# Patient Record
Sex: FEMALE | Race: WHITE | NOT HISPANIC OR LATINO | Employment: FULL TIME | URBAN - METROPOLITAN AREA
[De-identification: names, ages, dates, MRNs, and addresses within clinical notes are randomized per-mention and may not be internally consistent; named-entity substitution may affect disease eponyms.]

---

## 2017-01-23 ENCOUNTER — OFFICE VISIT (OUTPATIENT)
Dept: FAMILY MEDICINE CLINIC | Facility: CLINIC | Age: 42
End: 2017-01-23

## 2017-01-23 VITALS
BODY MASS INDEX: 19.81 KG/M2 | HEIGHT: 64 IN | HEART RATE: 71 BPM | DIASTOLIC BLOOD PRESSURE: 62 MMHG | TEMPERATURE: 98.2 F | SYSTOLIC BLOOD PRESSURE: 106 MMHG | WEIGHT: 116 LBS | OXYGEN SATURATION: 95 %

## 2017-01-23 DIAGNOSIS — Z00.00 LABORATORY EXAMINATION ORDERED AS PART OF A ROUTINE GENERAL MEDICAL EXAMINATION: ICD-10-CM

## 2017-01-23 DIAGNOSIS — E16.2 HYPOGLYCEMIA: Primary | ICD-10-CM

## 2017-01-23 PROCEDURE — 99213 OFFICE O/P EST LOW 20 MIN: CPT | Performed by: PHYSICIAN ASSISTANT

## 2017-01-23 RX ORDER — FEXOFENADINE HYDROCHLORIDE 60 MG/1
60 TABLET, FILM COATED ORAL DAILY
COMMUNITY

## 2017-01-23 RX ORDER — TRIAMCINOLONE ACETONIDE 55 UG/1
2 SPRAY, METERED NASAL DAILY
COMMUNITY

## 2017-01-23 RX ORDER — NORETHINDRONE ACETATE AND ETHINYL ESTRADIOL, ETHINYL ESTRADIOL AND FERROUS FUMARATE 1MG-10(24)
KIT ORAL
Refills: 12 | COMMUNITY
Start: 2016-12-27

## 2017-01-23 RX ORDER — CLINDAMYCIN PHOSPHATE AND BENZOYL PEROXIDE 10; 37.5 MG/G; MG/G
GEL TOPICAL
Refills: 11 | COMMUNITY
Start: 2016-12-05

## 2017-01-23 RX ORDER — VALACYCLOVIR HYDROCHLORIDE 1 G/1
TABLET, FILM COATED ORAL
Refills: 11 | COMMUNITY
Start: 2016-12-05

## 2017-01-23 NOTE — PATIENT INSTRUCTIONS
Low glycemic index diet  Exercise 30 minutes most days of the week  Make sure you get results on any labs or tests we ordered today  We discussed medications and how to take them as prescribed  Sleep 6-8 hours each night if possible  If you have not signed up for [employer], please activate your code ASAP from your After Visit Summary today    LDL goal <100  LDL goal if heart disease <70  HDL goal >60  Triglyceride goal <150  BP goal =<130/80  Fasting glucose <100

## 2017-01-23 NOTE — MR AVS SNAPSHOT
Farzana Sky   1/23/2017 9:45 AM   Office Visit    Provider:  Ciera Wise PA-C   Department:  [facility]   Dept Phone:  [phone]                Your Full Care Plan              Your Updated Medication List          This list is accurate as of: 1/23/17 10:48 AM.  Always use your most recent med list.                fexofenadine 60 MG tablet   Commonly known as:  ALLEGRA       LO LOESTRIN FE 1 MG-10 MCG / 10 MCG tablet   Generic drug:  Norethin-Eth Estrad-Fe Biphas       MULTI VITAMIN DAILY PO       ONEXTON 1.2-3.75 % gel   Generic drug:  Clindamycin Phos-Benzoyl Perox       Triamcinolone Acetonide 55 MCG/ACT nasal inhaler   Commonly known as:  NASACORT       valACYclovir 1000 MG tablet   Commonly known as:  VALTREX               We Performed the Following     Basic Metabolic Panel     C-Peptide     Hemoglobin A1c     Lipid Panel     T4, Free     TSH     Urinalysis With Microscopic       You Were Diagnosed With        Codes Comments    Hypoglycemia    -  Primary ICD-10-CM: E16.2  ICD-9-CM: 251.2     Laboratory examination ordered as part of a routine general medical examination     ICD-10-CM: Z00.00  ICD-9-CM: V72.62       Instructions    Low glycemic index diet  Exercise 30 minutes most days of the week  Make sure you get results on any labs or tests we ordered today  We discussed medications and how to take them as prescribed  Sleep 6-8 hours each night if possible  If you have not signed up for [employer], please activate your code ASAP from your After Visit Summary today    LDL goal <100  LDL goal if heart disease <70  HDL goal >60  Triglyceride goal <150  BP goal =<130/80  Fasting glucose <100         Patient Instructions History      [employer] Signup     [facility] [employer] allows you to send messages to your doctor, view your test results, renew your prescriptions, schedule appointments, and more. To sign up, go to ClaimReturn and click  "on the Sign Up Now link in the New User? box. Enter your [employer] Activation Code exactly as it appears below along with the last four digits of your Social Security Number and your Date of Birth () to complete the sign-up process. If you do not sign up before the expiration date, you must request a new code.    [employer] Activation Code: [email or web address]  Expires: 2017 10:47 AM    If you have questions, you can email [email or web address] or call [phone] to talk to our [employer] staff. Remember, [employer] is NOT to be used for urgent needs. For medical emergencies, dial 911.               Other Info from Your Visit           Allergies     No Known Allergies      Reason for Visit     Hypertension           Vital Signs     Blood Pressure Pulse Temperature Height Weight Oxygen Saturation    106/62 (BP Location: Right arm, Patient Position: Sitting, Cuff Size: Adult) 71 98.2 °F (36.8 °C) (Oral) 64\" (162.6 cm) 116 lb (52.6 kg) 95%    Body Mass Index Smoking Status                19.91 kg/m2 Never Smoker          Problems and Diagnoses Noted     Low blood sugar    -  Primary    Laboratory examination ordered as part of a routine general medical examination          "

## 2017-02-09 LAB
APPEARANCE UR: CLEAR
BACTERIA #/AREA URNS HPF: ABNORMAL /HPF
BILIRUB UR QL STRIP: NEGATIVE
BUN SERPL-MCNC: 11 MG/DL (ref 6–20)
BUN/CREAT SERPL: 13.1 (ref 7–25)
C PEPTIDE SERPL-MCNC: 1.2 NG/ML (ref 1.1–4.4)
CALCIUM SERPL-MCNC: 8.9 MG/DL (ref 8.6–10.5)
CASTS URNS MICRO: ABNORMAL
CHLORIDE SERPL-SCNC: 104 MMOL/L (ref 98–107)
CHOLEST SERPL-MCNC: 153 MG/DL (ref 0–200)
CO2 SERPL-SCNC: 25.4 MMOL/L (ref 22–29)
COLOR UR: YELLOW
CREAT SERPL-MCNC: 0.84 MG/DL (ref 0.57–1)
EPI CELLS #/AREA URNS HPF: ABNORMAL /HPF
GLUCOSE SERPL-MCNC: 82 MG/DL (ref 65–99)
GLUCOSE UR QL: NEGATIVE
HBA1C MFR BLD: 4.77 % (ref 4.8–5.6)
HDLC SERPL-MCNC: 69 MG/DL (ref 40–60)
HGB UR QL STRIP: NEGATIVE
KETONES UR QL STRIP: NEGATIVE
LDLC SERPL CALC-MCNC: 73 MG/DL (ref 0–100)
LEUKOCYTE ESTERASE UR QL STRIP: (no result)
NITRITE UR QL STRIP: NEGATIVE
PH UR STRIP: 7 [PH] (ref 5–8)
POTASSIUM SERPL-SCNC: 4.3 MMOL/L (ref 3.5–5.2)
PROT UR QL STRIP: NEGATIVE
RBC #/AREA URNS HPF: ABNORMAL /HPF
SODIUM SERPL-SCNC: 142 MMOL/L (ref 136–145)
SP GR UR: 1.02 (ref 1–1.03)
T4 FREE SERPL-MCNC: 1.14 NG/DL (ref 0.93–1.7)
TRIGL SERPL-MCNC: 57 MG/DL (ref 0–150)
TSH SERPL DL<=0.005 MIU/L-ACNC: 2.84 MIU/ML (ref 0.27–4.2)
UROBILINOGEN UR STRIP-MCNC: (no result) MG/DL
VLDLC SERPL CALC-MCNC: 11.4 MG/DL (ref 5–40)
WBC #/AREA URNS HPF: ABNORMAL /HPF

## 2017-09-18 ENCOUNTER — OFFICE VISIT (OUTPATIENT)
Dept: FAMILY MEDICINE CLINIC | Facility: CLINIC | Age: 42
End: 2017-09-18

## 2017-09-18 VITALS
HEART RATE: 54 BPM | TEMPERATURE: 98.9 F | DIASTOLIC BLOOD PRESSURE: 74 MMHG | OXYGEN SATURATION: 99 % | WEIGHT: 117 LBS | HEIGHT: 64 IN | RESPIRATION RATE: 16 BRPM | SYSTOLIC BLOOD PRESSURE: 110 MMHG | BODY MASS INDEX: 19.97 KG/M2

## 2017-09-18 DIAGNOSIS — I88.9 LYMPHADENITIS: ICD-10-CM

## 2017-09-18 DIAGNOSIS — J06.9 ACUTE URI: Primary | ICD-10-CM

## 2017-09-18 PROCEDURE — 99213 OFFICE O/P EST LOW 20 MIN: CPT | Performed by: PHYSICIAN ASSISTANT

## 2017-09-18 RX ORDER — AMOXICILLIN AND CLAVULANATE POTASSIUM 875; 125 MG/1; MG/1
1 TABLET, FILM COATED ORAL 2 TIMES DAILY
Qty: 20 TABLET | Refills: 0 | Status: SHIPPED | OUTPATIENT
Start: 2017-09-18 | End: 2018-04-25

## 2017-09-18 RX ORDER — FLUCONAZOLE 150 MG/1
150 TABLET ORAL ONCE
Qty: 1 TABLET | Refills: 2 | Status: SHIPPED | OUTPATIENT
Start: 2017-09-18 | End: 2017-09-18

## 2017-09-18 NOTE — PROGRESS NOTES
Subjective   Farzana Sky is a 41 y.o. female.     History of Present Illness    Farzana Sky 41 y.o. female who presents today for has palp neck node right side for 6mos and a little smaller.  Has had recent URI and feels another node right side.  she has a history of There is no problem list on file for this patient.  .    She has hx left arm radiculopathy burning at times    No SOA  I will Rx Augmentin and send her to ENT if does not resolve with Tx.  She agrees to make this appt  The following portions of the patient's history were reviewed and updated as appropriate: allergies, current medications, past family history, past medical history, past social history, past surgical history and problem list.    Review of Systems   Constitutional: Positive for fatigue. Negative for activity change, appetite change and unexpected weight change.   HENT: Positive for rhinorrhea, sinus pressure, sore throat, trouble swallowing and voice change. Negative for nosebleeds.    Eyes: Negative for pain and visual disturbance.   Respiratory: Positive for cough. Negative for chest tightness, shortness of breath and wheezing.    Cardiovascular: Negative for chest pain and palpitations.   Gastrointestinal: Negative for abdominal pain and blood in stool.   Endocrine: Positive for cold intolerance.   Genitourinary: Negative for difficulty urinating and hematuria.   Musculoskeletal: Positive for neck pain. Negative for joint swelling.   Skin: Negative for color change and rash.   Allergic/Immunologic: Positive for environmental allergies.   Neurological: Negative for syncope and speech difficulty.   Hematological: Negative for adenopathy.   Psychiatric/Behavioral: Negative for agitation and confusion.   All other systems reviewed and are negative.      Objective   Physical Exam   Constitutional: She is oriented to person, place, and time. She appears well-developed and well-nourished.  Non-toxic appearance. No distress.    HENT:   Head: Normocephalic and atraumatic. Hair is normal.   Right Ear: External ear normal. No drainage, swelling or tenderness. Tympanic membrane is retracted.   Left Ear: External ear normal. No drainage, swelling or tenderness. Tympanic membrane is retracted.   Nose: Mucosal edema present. No epistaxis.   Mouth/Throat: Uvula is midline and mucous membranes are normal. No oral lesions. No uvula swelling. Posterior oropharyngeal erythema present. No oropharyngeal exudate.   Eyes: Conjunctivae and EOM are normal. Pupils are equal, round, and reactive to light. Right eye exhibits no discharge. Left eye exhibits no discharge. No scleral icterus.   Neck: Normal range of motion. Neck supple.   Cardiovascular: Normal rate, regular rhythm and normal heart sounds.  Exam reveals no gallop.    No murmur heard.  Pulmonary/Chest: Breath sounds normal. No stridor. No respiratory distress. She has no wheezes. She has no rales. She exhibits no tenderness.   Abdominal: Soft. There is no tenderness.   Lymphadenopathy:     She has cervical adenopathy (small palp cervical chain and <1cm node right cervical for 6mos; mobile and not attached;  small palp occiptial nodes palp <1cm).   Neurological: She is alert and oriented to person, place, and time. She exhibits normal muscle tone.   Skin: Skin is warm and dry. No rash noted. She is not diaphoretic.   Psychiatric: She has a normal mood and affect. Her behavior is normal. Judgment and thought content normal.   Nursing note and vitals reviewed.      Assessment/Plan   Farzana was seen today for mass.    Diagnoses and all orders for this visit:    Acute URI    Lymphadenitis    Other orders  -     amoxicillin-clavulanate (AUGMENTIN) 875-125 MG per tablet; Take 1 tablet by mouth 2 (Two) Times a Day. One PO BID for infection  -     fluconazole (DIFLUCAN) 150 MG tablet; Take 1 tablet by mouth 1 (One) Time for 1 dose. One PO X 1 for yeast infection

## 2017-09-18 NOTE — PATIENT INSTRUCTIONS
Low glycemic index diet  Exercise 30 minutes most days of the week  Make sure you get results on any labs or tests we ordered today  We discussed medications and how to take them as prescribed  Sleep 6-8 hours each night if possible  If you have not signed up for [employer], please activate your code ASAP from your After Visit Summary today    LDL goal <100  LDL goal if heart disease <70  HDL goal >60  Triglyceride goal <150  BP goal =<130/80  Fasting glucose <100

## 2017-10-03 ENCOUNTER — APPOINTMENT (OUTPATIENT)
Dept: WOMENS IMAGING | Facility: HOSPITAL | Age: 42
End: 2017-10-03

## 2017-10-03 PROCEDURE — 77067 SCR MAMMO BI INCL CAD: CPT | Performed by: RADIOLOGY

## 2018-01-30 ENCOUNTER — OFFICE VISIT (OUTPATIENT)
Dept: FAMILY MEDICINE CLINIC | Facility: CLINIC | Age: 43
End: 2018-01-30

## 2018-01-30 VITALS
BODY MASS INDEX: 19.97 KG/M2 | SYSTOLIC BLOOD PRESSURE: 103 MMHG | HEART RATE: 64 BPM | OXYGEN SATURATION: 97 % | TEMPERATURE: 98.8 F | RESPIRATION RATE: 18 BRPM | HEIGHT: 64 IN | DIASTOLIC BLOOD PRESSURE: 65 MMHG | WEIGHT: 117 LBS

## 2018-01-30 DIAGNOSIS — L24.9 IRRITANT DERMATITIS: Primary | ICD-10-CM

## 2018-01-30 PROCEDURE — 99213 OFFICE O/P EST LOW 20 MIN: CPT | Performed by: NURSE PRACTITIONER

## 2018-01-30 RX ORDER — METHYLPREDNISOLONE 4 MG/1
TABLET ORAL
Qty: 21 TABLET | Refills: 0 | Status: SHIPPED | OUTPATIENT
Start: 2018-01-30 | End: 2018-04-25

## 2018-01-30 NOTE — PROGRESS NOTES
Subjective   Farzana Sky is a 42 y.o. female.     History of Present Illness   Patient presents to office for bilateral eyelid irritation and swelling off and on for a week.  She has noticed redness, burning, itching and puffiness to upper eyelids that is worse in the morning.  She states the only new product was mascara which she has now stopped using.  Symptoms have not improved much since.  She had some right eye irritation this morning when she woke up but states that has cleared up as well. Denies eye redness or discharge.  She denies headache or any other URI symptoms.  She sees allergist regularly for allergy injections.     The following portions of the patient's history were reviewed and updated as appropriate: allergies, current medications, past family history, past medical history, past social history, past surgical history and problem list.    Review of Systems   Constitutional: Negative for chills and fever.   Eyes: Positive for photophobia. Negative for pain, discharge, redness, itching and visual disturbance.   Skin: Positive for rash. Negative for color change.       Objective   Physical Exam   Constitutional: She is oriented to person, place, and time. She appears well-developed and well-nourished.   Eyes: Conjunctivae and EOM are normal. Pupils are equal, round, and reactive to light. Right eye exhibits no discharge and no exudate. Left eye exhibits no discharge and no exudate.   Mild erythema and puffiness to bilateral upper eyelids   Pulmonary/Chest: Effort normal.   Neurological: She is alert and oriented to person, place, and time.   Psychiatric: She has a normal mood and affect. Judgment normal.   Nursing note and vitals reviewed.      Assessment/Plan   Farzana was seen today for eye problem.    Diagnoses and all orders for this visit:    Irritant dermatitis  -     MethylPREDNISolone (MEDROL, NAHID,) 4 MG tablet; Take as directed on package instructions.        Patient will use zyrtec/bendadryl and  cool compresses along with medrol pack.       [facility].  Will f/u if recurrence or symptoms do not improve.

## 2018-02-19 NOTE — PROGRESS NOTES
"Pt to infusion services ambulatory per self.  Pt here for scheduled q 4 week Tysabri infusion.  Plan of care reviewed.  Pt verbalizes understanding.  Pt tells me she has received this course of treatment for \"10 years\" and tolerates well.  Pt denies any s/sx of infection today.  TOUCH pre infusion checklist reviewed and pt appropriate for Tysabri today.  PIV established to RAC, #24G.  IV flushes easily; + blood return verified.  Tysabri administered per MD orders.  Tysabri infusing at this time.  Pt resting in chair.  Call light at hand.  " Subjective   Farzana Sky is a 41 y.o. female.     History of Present Illness   Farzana Sky 41 y.o. female who presents today for glucose  she has a history of There is no problem list on file for this patient.  .        Brother, GM. Aunt DMII    No gestational DMII    Recent GYN glucose was 110  Had 54 at work  I will order HgbA1C and cpeptide    She avoid conc. Sweets  She feels like her glucose is low at times and eats candy when shaky.  Sometimes tired after having sugar.  Had labs at work last month and CMP only shows glucose abnormal at 54  CBC was normal  No recent exercise  The following portions of the patient's history were reviewed and updated as appropriate: allergies, current medications, past family history, past medical history, past social history, past surgical history and problem list.    Review of Systems   Constitutional: Positive for fatigue. Negative for activity change, appetite change and unexpected weight change.   HENT: Negative for nosebleeds and trouble swallowing.    Eyes: Negative for pain and visual disturbance.   Respiratory: Negative for chest tightness, shortness of breath and wheezing.    Cardiovascular: Negative for chest pain and palpitations.   Gastrointestinal: Negative for abdominal pain and blood in stool.   Endocrine: Negative.    Genitourinary: Positive for frequency. Negative for difficulty urinating and hematuria.   Musculoskeletal: Negative for joint swelling.   Skin: Negative for color change and rash.   Allergic/Immunologic: Positive for environmental allergies.   Neurological: Negative for syncope and speech difficulty.   Hematological: Negative for adenopathy.   Psychiatric/Behavioral: Negative for agitation and confusion.   All other systems reviewed and are negative.      Objective   Physical Exam   Constitutional: She is oriented to person, place, and time. She appears well-developed and well-nourished. No distress.   HENT:   Head: Normocephalic and atraumatic.    Eyes: Conjunctivae and EOM are normal. Pupils are equal, round, and reactive to light. Right eye exhibits no discharge. Left eye exhibits no discharge. No scleral icterus.   Neck: Normal range of motion. Neck supple. No tracheal deviation present. No thyromegaly present.   Cardiovascular: Normal rate, regular rhythm, normal heart sounds, intact distal pulses and normal pulses.  Exam reveals no gallop.    No murmur heard.  Pulmonary/Chest: Effort normal and breath sounds normal. No respiratory distress. She has no wheezes. She has no rales.   Musculoskeletal: Normal range of motion.   Neurological: She is alert and oriented to person, place, and time. She exhibits normal muscle tone. Coordination normal.   Skin: Skin is warm. No rash noted. No erythema. No pallor.   Psychiatric: She has a normal mood and affect. Her behavior is normal. Judgment and thought content normal.   Nursing note and vitals reviewed.      Assessment/Plan   Problems Addressed this Visit     None      Visit Diagnoses     Hypoglycemia    -  Primary    Laboratory examination ordered as part of a routine general medical examination

## 2018-04-25 ENCOUNTER — OFFICE VISIT (OUTPATIENT)
Dept: FAMILY MEDICINE CLINIC | Facility: CLINIC | Age: 43
End: 2018-04-25

## 2018-04-25 VITALS
SYSTOLIC BLOOD PRESSURE: 110 MMHG | BODY MASS INDEX: 20.49 KG/M2 | OXYGEN SATURATION: 100 % | DIASTOLIC BLOOD PRESSURE: 72 MMHG | WEIGHT: 120 LBS | HEART RATE: 62 BPM | TEMPERATURE: 98.4 F | RESPIRATION RATE: 16 BRPM | HEIGHT: 64 IN

## 2018-04-25 DIAGNOSIS — E16.2 HYPOGLYCEMIA: ICD-10-CM

## 2018-04-25 DIAGNOSIS — Z00.00 LABORATORY EXAMINATION ORDERED AS PART OF A ROUTINE GENERAL MEDICAL EXAMINATION: ICD-10-CM

## 2018-04-25 DIAGNOSIS — R00.2 PALPITATION: Primary | ICD-10-CM

## 2018-04-25 DIAGNOSIS — R06.00 DYSPNEA, UNSPECIFIED TYPE: ICD-10-CM

## 2018-04-25 PROCEDURE — 71046 X-RAY EXAM CHEST 2 VIEWS: CPT | Performed by: PHYSICIAN ASSISTANT

## 2018-04-25 PROCEDURE — 99214 OFFICE O/P EST MOD 30 MIN: CPT | Performed by: PHYSICIAN ASSISTANT

## 2018-04-25 NOTE — PATIENT INSTRUCTIONS
Low glycemic index diet  Exercise 30 minutes most days of the week  Make sure you get results on any labs or tests we ordered today  We discussed medications and how to take them as prescribed  Sleep 6-8 hours each night if possible  If you have not signed up for [employer], please activate your code ASAP from your After Visit Summary today    LDL goal <100  LDL goal if heart disease <70  HDL goal >60  Triglyceride goal <150  BP goal =<130/80  Fasting glucose <100      Postural Orthostatic Tachycardia Syndrome  Postural orthostatic tachycardia syndrome (POTS) is a group of symptoms that can occur when you stand up after lying down. POTS happens when less blood flows to the heart than normal after you stand up. The reduced blood flow to the heart makes the heart beat rapidly.  POTS may be associated with another medical condition, or it may occur on its own.  What are the causes?  This cause of this condition is not known, but many conditions and diseases have been linked to it.  What increases the risk?  This condition is more likely to develop in:  · Women 15-50 years old.  · Women who are pregnant.  · Women who are menstruating.  · People who have certain conditions, including:  ¨ A viral infection.  ¨ An autoimmune disease.  ¨ Anemia.  ¨ Dehydration.  ¨ Hyperthyroidism.  · People who take certain medicines.  · People who have had a major injury.  · People who have had surgery.  What are the signs or symptoms?  The most common symptom of this condition is lightheadedness after standing up from a lying down position. Other symptoms may include:  · Feeling a rapid increase in the speed of the heartbeat (tachycardia) within 10 minutes of standing up.  · Fainting.  · Weakness.  · Confusion.  · Trembling.  · Shortness of breath.  · Sweating or flushing.  · Headache.  · Chest pain.  · Breathing that is deeper and faster than normal (hyperventilation).  · Nausea.  · Anxiety.  Symptoms may be worse in the morning, and they  may be relieved by lying down.  How is this diagnosed?  This condition is diagnosed based on:  · Your symptoms.  · Your medical history.  · A physical exam.  · Measurements of your heart rate when you are lying down and after you stand up.  · A measurement of your blood pressure. The measurement will be taken when you go from lying down to standing up.  · Blood tests to measure hormones that change with blood pressure. The blood tests will be done when you are lying down and standing up.  You may have other tests to check whether you have a condition or disease that is linked to POTS.  How is this treated?  Treatment for this condition depends on how severe your symptoms are and whether you have any conditions or diseases that have been linked to POTS. Treatment may involve:  · Treating any conditions or diseases that have been linked to POTS.  · Drinking two glasses of water before getting up from a lying position.  · Eating more salt (sodium).  · Taking medicine to control blood pressure and heart rate (beta-blocker).  · Taking medicines to control blood flow, blood pressure, or heart rate.  · Avoiding certain medicines.  · Starting an exercise program under the supervision of your health care provider.  Follow these instructions at home:     Eating and drinking   · Drink enough fluid to keep your urine clear or pale yellow.  · If told by your health care provider, drink two glasses of water before getting up from a lying position.  · Follow instructions from your health care provider about how much sodium you should eat.  · Eat several small meals a day instead of a few large meals.  · Avoid heavy meals.  Medicines   · Take over-the-counter and prescription medicines only as told by your health care provider.  · Talk with your health care provider before starting any new medicines.  Activity   · Do an aerobic exercise for 20 minutes a day, at least 3 days a week.  · Ask your health care provider what kinds of  exercise are safe for you.  Contact a health care provider if:  · Your symptoms do not improve after treatment.  · Your symptoms get worse.  · You develop new symptoms.  Get help right away if:  · You have chest pain.  · You have difficulty breathing.  · You have fainting episodes.  This information is not intended to replace advice given to you by your health care provider. Make sure you discuss any questions you have with your health care provider.  Document Released: 12/08/2003 Document Revised: 01/25/2017 Document Reviewed: 07/01/2016  Elsevier Interactive Patient Education © 2017 [employer]

## 2018-04-25 NOTE — PROGRESS NOTES
Subjective   Farzana Sky is a 42 y.o. female.     History of Present Illness   Farzana Sky 42 y.o. female who presents today continued episodes of starting with weakness in legs for about 20 minutes; then get hot, sweaty, weak, dizzy, and lightheaded---some episodes of SOA with it she has a history of   Patient Active Problem List   Diagnosis   • Palpitation   Sometimes heart will race and episodes also of seeing black and has to sit down or may pass out.  This is getting more often and daily now.  She can have this one hour after eating; I did Cpeptide last Feb and normal  This is always when standing and ? POTS???  Will show labs and get cardio appt;     Patient complains of palpitations and a skipping sensation. The symptoms are mild, occur at bedtime and most nights and last seconds per episode. They tend to occur while at rest. Cardiac risk factors include: none. Aggravating factors are none:  Alleviating factors: not noted and just goes away. Associated symptoms: none. Patient denies: wheezing and chest pain.    X-Ray  Interpretation report in house X-rays that I personally viewed    Relevant Clinical Issues/Diagnoses/Indications:  Episodes dyspnea        Clinical Findings:  No mass and no cardiomeg          Comparative Data:  none          Date of Previous X-ray:  Change on current X-ray    The following portions of the patient's history were reviewed and updated as appropriate: allergies, current medications, past family history, past medical history, past social history, past surgical history and problem list.    Review of Systems   Constitutional: Negative for activity change, appetite change and unexpected weight change.   HENT: Negative for nosebleeds and trouble swallowing.    Eyes: Negative for pain and visual disturbance.   Respiratory: Negative for chest tightness, shortness of breath and wheezing.    Cardiovascular: Positive for palpitations. Negative for chest pain.   Gastrointestinal: Negative for  abdominal pain and blood in stool.   Endocrine: Negative.    Genitourinary: Negative for difficulty urinating and hematuria.   Musculoskeletal: Negative for joint swelling.   Skin: Negative for color change and rash.   Allergic/Immunologic: Positive for environmental allergies.   Neurological: Positive for dizziness, weakness and light-headedness. Negative for syncope and speech difficulty.   Hematological: Negative for adenopathy.   Psychiatric/Behavioral: Negative for agitation and confusion.   All other systems reviewed and are negative.      Objective   Physical Exam   Constitutional: She is oriented to person, place, and time. She appears well-developed and well-nourished. No distress.   HENT:   Head: Normocephalic and atraumatic.   Eyes: Conjunctivae and EOM are normal. Pupils are equal, round, and reactive to light. Right eye exhibits no discharge. Left eye exhibits no discharge. No scleral icterus.   Neck: Normal range of motion. Neck supple. No tracheal deviation present. No thyromegaly present.   Cardiovascular: Normal rate, regular rhythm, normal heart sounds, intact distal pulses and normal pulses.  Exam reveals no gallop.    No murmur heard.  Pulmonary/Chest: Effort normal and breath sounds normal. No respiratory distress. She has no wheezes. She has no rales.   Abdominal: Soft.   Musculoskeletal: Normal range of motion.   Lymphadenopathy:     She has no cervical adenopathy.   Neurological: She is alert and oriented to person, place, and time. She exhibits normal muscle tone. Coordination normal.   Skin: Skin is warm. No rash noted. No erythema. No pallor.   Psychiatric: She has a normal mood and affect. Her behavior is normal. Judgment and thought content normal.   Nursing note and vitals reviewed.      Assessment/Plan   Farzana was seen today for dizziness.    Diagnoses and all orders for this visit:    Palpitation  Comments:  ?POTS?  Orders:  -     Comprehensive metabolic panel  -     Lipid panel  -      CBC and Differential  -     TSH  -     T3, Free  -     T4, Free  -     Hemoglobin A1c  -     C-Peptide  -     XR Chest PA & Lateral  -     Vitamin D 25 Hydroxy  -     Vitamin B12  -     Folate  -     Ambulatory Referral to Cardiology    Dyspnea, unspecified type  -     Comprehensive metabolic panel  -     Lipid panel  -     CBC and Differential  -     TSH  -     T3, Free  -     T4, Free  -     Hemoglobin A1c  -     C-Peptide  -     XR Chest PA & Lateral  -     Vitamin D 25 Hydroxy  -     Vitamin B12  -     Folate  -     Ambulatory Referral to Cardiology    Laboratory examination ordered as part of a routine general medical examination  -     Comprehensive metabolic panel  -     Lipid panel  -     CBC and Differential  -     TSH  -     T3, Free  -     T4, Free  -     Hemoglobin A1c  -     C-Peptide  -     XR Chest PA & Lateral  -     Vitamin D 25 Hydroxy  -     Vitamin B12  -     Folate  -     Ambulatory Referral to Cardiology    Hypoglycemia

## 2018-05-10 LAB
25(OH)D3+25(OH)D2 SERPL-MCNC: 49.3 NG/ML (ref 30–100)
ALBUMIN SERPL-MCNC: 4.4 G/DL (ref 3.5–5.2)
ALBUMIN/GLOB SERPL: 1.6 G/DL
ALP SERPL-CCNC: 52 U/L (ref 39–117)
ALT SERPL-CCNC: 10 U/L (ref 1–33)
AST SERPL-CCNC: 16 U/L (ref 1–32)
BASOPHILS # BLD AUTO: 0.04 10*3/MM3 (ref 0–0.2)
BASOPHILS NFR BLD AUTO: 1 % (ref 0–1.5)
BILIRUB SERPL-MCNC: 0.4 MG/DL (ref 0.1–1.2)
BUN SERPL-MCNC: 12 MG/DL (ref 6–20)
BUN/CREAT SERPL: 15 (ref 7–25)
C PEPTIDE SERPL-MCNC: 1.2 NG/ML (ref 1.1–4.4)
CALCIUM SERPL-MCNC: 9.1 MG/DL (ref 8.6–10.5)
CHLORIDE SERPL-SCNC: 103 MMOL/L (ref 98–107)
CHOLEST SERPL-MCNC: 162 MG/DL (ref 0–200)
CO2 SERPL-SCNC: 26 MMOL/L (ref 22–29)
CREAT SERPL-MCNC: 0.8 MG/DL (ref 0.57–1)
EOSINOPHIL # BLD AUTO: 0.08 10*3/MM3 (ref 0–0.7)
EOSINOPHIL NFR BLD AUTO: 1.9 % (ref 0.3–6.2)
ERYTHROCYTE [DISTWIDTH] IN BLOOD BY AUTOMATED COUNT: 12.9 % (ref 11.7–13)
FOLATE SERPL-MCNC: 16.96 NG/ML (ref 4.78–24.2)
GFR SERPLBLD CREATININE-BSD FMLA CKD-EPI: 79 ML/MIN/1.73
GFR SERPLBLD CREATININE-BSD FMLA CKD-EPI: 96 ML/MIN/1.73
GLOBULIN SER CALC-MCNC: 2.8 GM/DL
GLUCOSE SERPL-MCNC: 80 MG/DL (ref 65–99)
HBA1C MFR BLD: 4.7 % (ref 4.8–5.6)
HCT VFR BLD AUTO: 42.5 % (ref 35.6–45.5)
HDLC SERPL-MCNC: 59 MG/DL (ref 40–60)
HGB BLD-MCNC: 13.8 G/DL (ref 11.9–15.5)
IMM GRANULOCYTES # BLD: 0 10*3/MM3 (ref 0–0.03)
IMM GRANULOCYTES NFR BLD: 0 % (ref 0–0.5)
LDLC SERPL CALC-MCNC: 87 MG/DL (ref 0–100)
LYMPHOCYTES # BLD AUTO: 1.01 10*3/MM3 (ref 0.9–4.8)
LYMPHOCYTES NFR BLD AUTO: 24 % (ref 19.6–45.3)
MCH RBC QN AUTO: 32.3 PG (ref 26.9–32)
MCHC RBC AUTO-ENTMCNC: 32.5 G/DL (ref 32.4–36.3)
MCV RBC AUTO: 99.5 FL (ref 80.5–98.2)
MONOCYTES # BLD AUTO: 0.22 10*3/MM3 (ref 0.2–1.2)
MONOCYTES NFR BLD AUTO: 5.2 % (ref 5–12)
NEUTROPHILS # BLD AUTO: 2.86 10*3/MM3 (ref 1.9–8.1)
NEUTROPHILS NFR BLD AUTO: 67.9 % (ref 42.7–76)
PLATELET # BLD AUTO: 194 10*3/MM3 (ref 140–500)
POTASSIUM SERPL-SCNC: 4.6 MMOL/L (ref 3.5–5.2)
PROT SERPL-MCNC: 7.2 G/DL (ref 6–8.5)
RBC # BLD AUTO: 4.27 10*6/MM3 (ref 3.9–5.2)
SODIUM SERPL-SCNC: 141 MMOL/L (ref 136–145)
T3FREE SERPL-MCNC: 2.7 PG/ML (ref 2–4.4)
T4 FREE SERPL-MCNC: 1.23 NG/DL (ref 0.93–1.7)
TRIGL SERPL-MCNC: 81 MG/DL (ref 0–150)
TSH SERPL DL<=0.005 MIU/L-ACNC: 2.86 MIU/ML (ref 0.27–4.2)
VIT B12 SERPL-MCNC: 766 PG/ML (ref 211–946)
VLDLC SERPL CALC-MCNC: 16.2 MG/DL (ref 5–40)
WBC # BLD AUTO: 4.21 10*3/MM3 (ref 4.5–10.7)

## 2018-10-09 ENCOUNTER — APPOINTMENT (OUTPATIENT)
Dept: WOMENS IMAGING | Facility: HOSPITAL | Age: 43
End: 2018-10-09

## 2018-10-09 PROCEDURE — 77063 BREAST TOMOSYNTHESIS BI: CPT | Performed by: RADIOLOGY

## 2018-10-09 PROCEDURE — 77067 SCR MAMMO BI INCL CAD: CPT | Performed by: RADIOLOGY

## 2018-10-29 ENCOUNTER — OFFICE VISIT (OUTPATIENT)
Dept: FAMILY MEDICINE CLINIC | Facility: CLINIC | Age: 43
End: 2018-10-29

## 2018-10-29 VITALS
HEART RATE: 76 BPM | BODY MASS INDEX: 21 KG/M2 | HEIGHT: 64 IN | RESPIRATION RATE: 16 BRPM | SYSTOLIC BLOOD PRESSURE: 122 MMHG | OXYGEN SATURATION: 100 % | WEIGHT: 123 LBS | TEMPERATURE: 98.6 F | DIASTOLIC BLOOD PRESSURE: 68 MMHG

## 2018-10-29 DIAGNOSIS — M76.61 ACHILLES TENDINITIS OF BOTH LOWER EXTREMITIES: ICD-10-CM

## 2018-10-29 DIAGNOSIS — S39.012A STRAIN OF LUMBAR REGION, INITIAL ENCOUNTER: ICD-10-CM

## 2018-10-29 DIAGNOSIS — M76.62 ACHILLES TENDINITIS OF BOTH LOWER EXTREMITIES: ICD-10-CM

## 2018-10-29 DIAGNOSIS — M72.2 PLANTAR FASCIITIS: Primary | ICD-10-CM

## 2018-10-29 PROCEDURE — 99213 OFFICE O/P EST LOW 20 MIN: CPT | Performed by: PHYSICIAN ASSISTANT

## 2018-10-29 RX ORDER — IBUPROFEN 800 MG/1
800 TABLET ORAL EVERY 8 HOURS PRN
Qty: 60 TABLET | Refills: 1 | Status: SHIPPED | OUTPATIENT
Start: 2018-10-29

## 2018-10-29 RX ORDER — BACLOFEN 10 MG/1
10 TABLET ORAL 3 TIMES DAILY
Qty: 45 TABLET | Refills: 1 | Status: SHIPPED | OUTPATIENT
Start: 2018-10-29 | End: 2018-12-03

## 2018-10-29 NOTE — PROGRESS NOTES
Subjective   Farzana Sky is a 42 y.o. female.     History of Present Illness   Patient complains of bilateral plantar heel pain and now achilles pain. The symptoms began several weeks ago.  Pain is a result of likely started from having plantar fasciitis and got new shoes and orthotics. Also doing exercises for it.  Fell down stairs Sat and sore lumbar mid distal; sore to touch and ROM;  Pain is located L>R region. Discomfort is described as aching and throbbing. Sometimes burns; Symptoms are exacerbated by pressure applied to area and movement.  Evaluation to date: none. Therapy to date includes: ice and OTC NSAIDs    I will have her see ortho next;      The following portions of the patient's history were reviewed and updated as appropriate: allergies, current medications, past family history, past medical history, past social history, past surgical history and problem list.    Review of Systems   Constitutional: Negative for activity change, appetite change and unexpected weight change.   HENT: Negative for nosebleeds and trouble swallowing.    Eyes: Negative for pain and visual disturbance.   Respiratory: Negative for chest tightness, shortness of breath and wheezing.    Cardiovascular: Positive for leg swelling. Negative for chest pain and palpitations.   Gastrointestinal: Negative for abdominal pain and blood in stool.   Endocrine: Negative.    Genitourinary: Negative for difficulty urinating and hematuria.   Musculoskeletal: Positive for arthralgias, back pain and joint swelling.   Skin: Negative for color change and rash.   Allergic/Immunologic: Positive for environmental allergies.   Neurological: Negative for syncope and speech difficulty.   Hematological: Negative for adenopathy.   Psychiatric/Behavioral: Negative for agitation and confusion.   All other systems reviewed and are negative.      Objective   Physical Exam   Constitutional: She is oriented to person, place, and time. She appears  well-developed and well-nourished. No distress.   HENT:   Head: Normocephalic and atraumatic.   Eyes: Pupils are equal, round, and reactive to light. Conjunctivae and EOM are normal. Right eye exhibits no discharge. Left eye exhibits no discharge. No scleral icterus.   Neck: Normal range of motion. Neck supple. No tracheal deviation present. No thyromegaly present.   Cardiovascular: Normal pulses.    Pulmonary/Chest: Effort normal and breath sounds normal. No respiratory distress. She has no wheezes. She has no rales.   Musculoskeletal: Normal range of motion. She exhibits tenderness. She exhibits no edema or deformity.   Sore heels bilat to touch and bear weight; achilles tender bilat L >R; squeezing calf does plantar flex; sore; not hot or red  Sore lumbar sacral midline bilat and across ;  ROM pain; Motor and sensation intact  SLR to 90* bilateral  Dorsiflexion of great toes intact     Neurological: She is alert and oriented to person, place, and time. She exhibits normal muscle tone. Coordination normal.   Skin: Skin is warm. No rash noted. No erythema. No pallor.   Psychiatric: She has a normal mood and affect. Her behavior is normal. Judgment and thought content normal.   Nursing note and vitals reviewed.      Assessment/Plan   Farzana was seen today for foot pain and fall.    Diagnoses and all orders for this visit:    Plantar fasciitis  -     Ambulatory Referral to Orthopedic Surgery    Achilles tendinitis of both lower extremities  -     Ambulatory Referral to Orthopedic Surgery    Strain of lumbar region, initial encounter    Other orders  -     ibuprofen (ADVIL,MOTRIN) 800 MG tablet; Take 1 tablet by mouth Every 8 (Eight) Hours As Needed for Mild Pain . Stop if GI upset  -     baclofen (LIORESAL) 10 MG tablet; Take 1 tablet by mouth 3 (Three) Times a Day. PRN spasms

## 2018-10-29 NOTE — PATIENT INSTRUCTIONS
Rest and ice  Achilles Tendinitis  Achilles tendinitis is inflammation of the tough, cord-like band that attaches the lower leg muscles to the heel bone (Achilles tendon). This is usually caused by overusing the tendon and the ankle joint.  Achilles tendinitis usually gets better over time with treatment and caring for yourself at home. It can take weeks or months to heal completely.  What are the causes?  This condition may be caused by:  · A sudden increase in exercise or activity, such as running.  · Doing the same exercises or activities (such as jumping) over and over.  · Not warming up calf muscles before exercising.  · Exercising in shoes that are worn out or not made for exercise.  · Having arthritis or a bone growth (spur) on the back of the heel bone. This can rub against the tendon and hurt it.  · Age-related wear and tear. Tendons become less flexible with age and more likely to be injured.    What are the signs or symptoms?  Common symptoms of this condition include:  · Pain in the Achilles tendon or in the back of the leg, just above the heel. The pain usually gets worse with exercise.  · Stiffness or soreness in the back of the leg, especially in the morning.  · Swelling of the skin over the Achilles tendon.  · Thickening of the tendon.  · Bone spurs at the bottom of the Achilles tendon, near the heel.  · Trouble standing on tiptoe.    How is this diagnosed?  This condition is diagnosed based on your symptoms and a physical exam. You may have tests, including:  · X-rays.  · MRI.    How is this treated?  The goal of treatment is to relieve symptoms and help your injury heal. Treatment may include:  · Decreasing or stopping activities that caused the tendinitis. This may mean switching to low-impact exercises like biking or swimming.  · Icing the injured area.  · Doing physical therapy, including strengthening and stretching exercises.  · NSAIDs to help relieve pain and swelling.  · Using supportive  shoes, wraps, heel lifts, or a walking boot (air cast).  · Surgery. This may be done if your symptoms do not improve after 6 months.  · Using high-energy shock wave impulses to stimulate the healing process (extracorporeal shock wave therapy). This is rare.  · Injection of medicines to help relieve inflammation (corticosteroids). This is rare.    Follow these instructions at home:  If you have an air cast:  · Wear the cast as told by your health care provider. Remove it only as told by your health care provider.  · Loosen the cast if your toes tingle, become numb, or turn cold and blue.  Activity  · Gradually return to your normal activities once your health care provider approves. Do not do activities that cause pain.  ? Consider doing low-impact exercises, like cycling or swimming.  · If you have an air cast, ask your health care provider when it is safe for you to drive.  · If physical therapy was prescribed, do exercises as told by your health care provider or physical therapist.  Managing pain, stiffness, and swelling  · Raise (elevate) your foot above the level of your heart while you are sitting or lying down.  · Move your toes often to avoid stiffness and to lessen swelling.  · If directed, put ice on the injured area:  ? Put ice in a plastic bag.  ? Place a towel between your skin and the bag.  ? Leave the ice on for 20 minutes, 2-3 times a day  General instructions  · If directed, wrap your foot with an elastic bandage or other wrap. This can help keep your tendon from moving too much while it heals. Your health care provider will show you how to wrap your foot correctly.  · Wear supportive shoes or heel lifts only as told by your health care provider.  · Take over-the-counter and prescription medicines only as told by your health care provider.  · Keep all follow-up visits as told by your health care provider. This is important.  Contact a health care provider if:  · You have symptoms that gets  worse.  · You have pain that does not get better with medicine.  · You develop new, unexplained symptoms.  · You develop warmth and swelling in your foot.  · You have a fever.  Get help right away if:  · You have a sudden popping sound or sensation in your Achilles tendon followed by severe pain.  · You cannot move your toes or foot.  · You cannot put any weight on your foot.  Summary  · Achilles tendinitis is inflammation of the tough, cord-like band that attaches the lower leg muscles to the heel bone (Achilles tendon).  · This condition is usually caused by overusing the tendon and the ankle joint. It can also be caused by arthritis or normal aging.  · The most common symptoms of this condition include pain, swelling, or stiffness in the Achilles tendon or in the back of the leg.  · This condition is usually treated with rest, NSAIDs, and physical therapy.  This information is not intended to replace advice given to you by your health care provider. Make sure you discuss any questions you have with your health care provider.  Document Released: 09/27/2006 Document Revised: 11/06/2017 Document Reviewed: 11/06/2017  [employer] Interactive Patient Education © 2017 [employer]

## 2018-12-03 ENCOUNTER — OFFICE VISIT (OUTPATIENT)
Dept: ORTHOPEDIC SURGERY | Facility: CLINIC | Age: 43
End: 2018-12-03

## 2018-12-03 VITALS — WEIGHT: 123 LBS | BODY MASS INDEX: 21 KG/M2 | HEIGHT: 64 IN | TEMPERATURE: 98.3 F

## 2018-12-03 DIAGNOSIS — M72.2 PLANTAR FASCIITIS: Primary | ICD-10-CM

## 2018-12-03 DIAGNOSIS — M76.62 ACHILLES TENDONITIS, BILATERAL: ICD-10-CM

## 2018-12-03 DIAGNOSIS — M79.672 FOOT PAIN, BILATERAL: ICD-10-CM

## 2018-12-03 DIAGNOSIS — M77.50 RETROCALCANEAL BURSITIS, UNSPECIFIED LATERALITY: ICD-10-CM

## 2018-12-03 DIAGNOSIS — M76.61 ACHILLES TENDONITIS, BILATERAL: ICD-10-CM

## 2018-12-03 DIAGNOSIS — M79.671 FOOT PAIN, BILATERAL: ICD-10-CM

## 2018-12-03 PROCEDURE — 99244 OFF/OP CNSLTJ NEW/EST MOD 40: CPT | Performed by: ORTHOPAEDIC SURGERY

## 2018-12-03 PROCEDURE — 73630 X-RAY EXAM OF FOOT: CPT | Performed by: ORTHOPAEDIC SURGERY

## 2018-12-03 NOTE — PROGRESS NOTES
New Patient Complaint      Patient: Farzana Sky  YOB: 1975 42 y.o. female  Medical Record Number: [id number]    Chief Complaints: My heels and my ankles hurt     History of Present Illness:  Patient reports a 7 month history of moderate intermittent stabbing aching burning pain in both heels and subcutaneous into the posterior aspect of both ankles with some extension more proximally on the left.  Symptoms are worse with standing and walking and improved anti-inflammatories ice and rest and are associated with swelling.    She's been treating herself with new shoe inserts and shoes as well as some gentle stretching exercises and the right has significantly improved.  Left has improved but has not resolved.  Main areas that hurt the plantar aspect of the heel in the retrocalcaneal bursal area along the Achilles as well.  She lives [place] acres [place] land and takes care of a lot of animals.  She works at [employer]    She is seen today at the request of Ciera Wise PA-C as requested my opinion regarding etiology and treatment of this condition.        HPI    Allergies: No Known Allergies    Medications:   Current Outpatient Medications on File Prior to Visit   Medication Sig   • fexofenadine (ALLEGRA) 60 MG tablet Take 60 mg by mouth Daily.   • LO LOESTRIN FE 1 MG-10 MCG / 10 MCG tablet TK 1 T PO D   • Multiple Vitamin (MULTI VITAMIN DAILY PO) Take  by mouth.   • ONEXTON 1.2-3.75 % gel APPLY TO FACE NIGHTLY UTD   • Triamcinolone Acetonide (NASACORT) 55 MCG/ACT nasal inhaler 2 sprays into each nostril Daily.   • valACYclovir (VALTREX) 1000 MG tablet TK 1 T PO D   • [DISCONTINUED] baclofen (LIORESAL) 10 MG tablet Take 1 tablet by mouth 3 (Three) Times a Day. PRN spasms   • ibuprofen (ADVIL,MOTRIN) 800 MG tablet Take 1 tablet by mouth Every 8 (Eight) Hours As Needed for Mild Pain . Stop if GI upset     No current facility-administered medications on file prior to visit.        Past Medical History:  "  Diagnosis Date   • Abnormal MRI, cervical spine     DDD changes C4-5, 5-6 with mod R lateral disc protrusion of broad based herniation abuts ventral surface of cord anterlolaterally to R with mild to mod flattening of cord at c5-6, mild cerebellar tonsillar ectopia   • Allergic dermatitis of eyelid 03/02/2011   • Cervical radiculopathy 10/19/2015   • Clavicle fracture     age 18, right, closed   • Endometrial polyp 03/2011   • H/O colonoscopy 07/01/2011    [facility]: Dr. Gonzalez: internal hemorrhiods and non-thrombosed external hemorrhoids   • Headache    • History of esophagogastroduodenoscopy (EGD) 07/01/2011    [facility]: Dr. Gonzalez: LA grade B esophagitis, erythema of gastric mucosa    • IBS (irritable bowel syndrome) 05/25/2011   • Sialoadenitis 06/10/2009     Past Surgical History:   Procedure Laterality Date   • CHOLECYSTECTOMY       Social History     Occupational History   • Not on file   Tobacco Use   • Smoking status: Never Smoker   • Smokeless tobacco: Never Used   Substance and Sexual Activity   • Alcohol use: Yes     Comment: Social   • Drug use: No   • Sexual activity: Defer      Social History     Social History Narrative   • Not on file     Family History   Problem Relation Age of Onset   • Other Mother         trigeminal neuralgia   • Thyroid cancer Mother    • Alcohol abuse Father    • Asthma Father    • Diabetes Brother    • Diabetes Maternal Grandmother        Review of Systems: 14 point review of systems performed, positive pertinent findings identified in HPI. All remaining systems negative     Review of Systems      Physical Exam:   Vitals:    12/03/18 1459   Temp: 98.3 °F (36.8 °C)   Weight: 55.8 kg (123 lb)   Height: 162.6 cm (64\")     Physical Exam   Constitutional: pleasant, well developed   Eyes: sclera non icteric  Hearing : adequate for exam  Cardiovascular: palpable pulses in bilaeral feet, bilateral calves/ thighs NT without sign of DVT  Respiratoy: breathing unlabored   Neurological: grossly " sensate to LT throughout bilateral LEs  Psychiatric: oriented with normal mood and affect.   Lymphatic: No palpable popliteal lymphadenopathy bilateral LEs  Skin: intact throughout bilateral legs/feet  Musculoskeletal: Nonantalgic gait.  No focal warmth erythema or swelling to either lower extremity.  There is minimal if any discomfort palpation at the insertion of the plantar fascia and along the Achilles and retrocalcaneal bursa on the right.  On the left there is moderate discomfort at the insertion of the plantar fascia was discomfort in the retrocalcaneal bursa but no focal swelling along the Achilles.  5 out of 5 plantarflexion strength.  Mild discomfort along the left Achilles  Physical Exam  Ortho Exam    Radiology: 3 views of both ankles ordered to evaluate pain reviewed and no prior records available for comparison there is slight plantar spurring as well as some prominence of the superior calcaneal tuberosity left greater than right.  On the left there is an os trigonum.    Assessment/Plan: 1.  Bilateral plantar fasciitis left greater than right  2.  Retrocalcaneal bursitis left greater than right with os trigonum on the left  3.  Achilles tendinitis    We discussed treatment options and the right is markedly better and left is improving I would not recommend any surgical treatment nor any further imaging at this time.    I demonstrated heel cord stretching exercises for her do at least 4 times a day.  Instruction she was provided and demonstrated for her and we will send her for a night splint on the left and encouraged use of ice and may continue use of ibuprofen as needed.    We'll also have her see Dr. Ellis at [facility] to see what other nonoperative modalities he has to offer and I will see her back in 6 weeks.  If symptoms persist or worsen we'll get an MRI of her left hindfoot.

## 2018-12-17 ENCOUNTER — OFFICE VISIT (OUTPATIENT)
Dept: SPORTS MEDICINE | Facility: CLINIC | Age: 43
End: 2018-12-17

## 2018-12-17 VITALS
BODY MASS INDEX: 19.97 KG/M2 | DIASTOLIC BLOOD PRESSURE: 68 MMHG | SYSTOLIC BLOOD PRESSURE: 116 MMHG | HEART RATE: 62 BPM | OXYGEN SATURATION: 99 % | WEIGHT: 117 LBS | HEIGHT: 64 IN

## 2018-12-17 DIAGNOSIS — M76.62 ACHILLES TENDINITIS OF BOTH LOWER EXTREMITIES: ICD-10-CM

## 2018-12-17 DIAGNOSIS — M76.61 ACHILLES TENDINITIS OF BOTH LOWER EXTREMITIES: ICD-10-CM

## 2018-12-17 DIAGNOSIS — M79.672 CHRONIC HEEL PAIN, LEFT: Primary | ICD-10-CM

## 2018-12-17 DIAGNOSIS — M79.671 CHRONIC HEEL PAIN, RIGHT: ICD-10-CM

## 2018-12-17 DIAGNOSIS — G89.29 CHRONIC HEEL PAIN, LEFT: Primary | ICD-10-CM

## 2018-12-17 DIAGNOSIS — G89.29 CHRONIC HEEL PAIN, RIGHT: ICD-10-CM

## 2018-12-17 PROCEDURE — 99244 OFF/OP CNSLTJ NEW/EST MOD 40: CPT | Performed by: FAMILY MEDICINE

## 2018-12-17 RX ORDER — NITROGLYCERIN 0.1MG/HR
PATCH, TRANSDERMAL 24 HOURS TRANSDERMAL
Qty: 10 PATCH | Refills: 0 | Status: SHIPPED | OUTPATIENT
Start: 2018-12-17 | End: 2020-02-03

## 2018-12-17 NOTE — PROGRESS NOTES
"Farznaa is a 43 y.o. year old female    Chief Complaint   Patient presents with   • Left Ankle - Pain   • Right Ankle - Pain       History of Present Illness  Referred here by Dr. Polo for ongoing bilateral heel pain.  Samreen reports that her symptoms began around [place] this year.  She has had pain in the heels, left worse than right that has radiated up into her calf as well as down into her foot.  Has been self treating for plantar fasciitis.  Her right heel pain has been slowly improving.  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  She enjoys walking for fitness both outside and on the treadmill.  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  Has purchased power step inserts, appropriate fitting new balance shoes from [employer].    I have reviewed the patient's medical, family, and social history in detail and updated the computerized patient record.    Review of Systems  Constitutional: Negative for fever.   Musculoskeletal:        Per HPI   Skin: Negative for rash.   Neurological: Negative for weakness and numbness.   Psychiatric/Behavioral: Negative for sleep disturbance.   All other systems reviewed and are negative.    /68 (BP Location: Left arm, Patient Position: Sitting, Cuff Size: Adult)   Pulse 62   Ht 162.6 cm (64.02\")   Wt 53.1 kg (117 lb)   SpO2 99%   BMI 20.07 kg/m²      Physical Exam    Vital signs reviewed.   General: No acute distress.  Eyes: conjunctiva clear; pupils equally round and reactive  ENT: external ears and nose atraumatic; oropharynx clear  CV: no peripheral edema, 2+ distal pulses  Resp: normal respiratory effort, no use of accessory muscles  Skin: no rashes or wounds; normal turgor  Psych: mood and affect appropriate; recent and remote memory intact  Neuro: sensation to light touch intact    MSK Exam:    Bilateral ankle, foot demonstrates full range of motion.  Negative anterior drawer.  " Tenderness along the distal left Achilles, retrocalcaneal bursa.  Tenderness along the origin of the left plantar fascia.  5/5 strength.    Left Ankle X-Ray  Indication: Pain  Views: AP, Lateral, Mortise    Findings:  No fracture  Os trigonum   Soft tissues normal  Normal joint spaces    No prior studies available for comparison.    Diagnoses and all orders for this visit:    Chronic heel pain, left  -     XR Ankle 3+ View Left    Chronic heel pain, right    Achilles tendinitis of both lower extremities  -     nitroglycerin (NITRODUR) 0.1 MG/HR patch; Use as directed      Discuss treatment options for her heel pain, Achilles tendinitis.  Discussed utility of Nitropatch in this setting to help increase and promote blood flow to the area.  Encouraged her to purchase [place] sock for the left heel to wear at night.  Home exercise program given.  Did discuss potential advanced imaging after 6 weeks if persistent.    EMR Dragon/Transcription disclaimer:    Much of this encounter note is an electronic transcription/translation of spoken language to printed text.  The electronic translation of spoken language may permit erroneous, or at times, nonsensical words or phrases to be inadvertently transcribed.  Although I have reviewed the note for such errors some may still exist.

## 2019-10-11 ENCOUNTER — APPOINTMENT (OUTPATIENT)
Dept: WOMENS IMAGING | Facility: HOSPITAL | Age: 44
End: 2019-10-11

## 2019-10-11 PROCEDURE — 77067 SCR MAMMO BI INCL CAD: CPT | Performed by: RADIOLOGY

## 2019-10-11 PROCEDURE — 77063 BREAST TOMOSYNTHESIS BI: CPT | Performed by: RADIOLOGY

## 2020-02-03 ENCOUNTER — OFFICE VISIT (OUTPATIENT)
Dept: FAMILY MEDICINE CLINIC | Facility: CLINIC | Age: 45
End: 2020-02-03

## 2020-02-03 VITALS
HEIGHT: 64 IN | SYSTOLIC BLOOD PRESSURE: 110 MMHG | DIASTOLIC BLOOD PRESSURE: 62 MMHG | OXYGEN SATURATION: 100 % | HEART RATE: 65 BPM | BODY MASS INDEX: 21.51 KG/M2 | TEMPERATURE: 98.6 F | RESPIRATION RATE: 16 BRPM | WEIGHT: 126 LBS

## 2020-02-03 DIAGNOSIS — J01.90 ACUTE SINUSITIS, RECURRENCE NOT SPECIFIED, UNSPECIFIED LOCATION: Primary | ICD-10-CM

## 2020-02-03 DIAGNOSIS — H57.11 ORBITAL PAIN, RIGHT: ICD-10-CM

## 2020-02-03 PROCEDURE — 99213 OFFICE O/P EST LOW 20 MIN: CPT | Performed by: PHYSICIAN ASSISTANT

## 2020-02-03 RX ORDER — PREDNISONE 20 MG/1
20 TABLET ORAL 2 TIMES DAILY
Qty: 10 TABLET | Refills: 0 | Status: SHIPPED | OUTPATIENT
Start: 2020-02-03 | End: 2020-08-03

## 2020-02-03 RX ORDER — AMOXICILLIN AND CLAVULANATE POTASSIUM 875; 125 MG/1; MG/1
1 TABLET, FILM COATED ORAL EVERY 12 HOURS SCHEDULED
Qty: 20 TABLET | Refills: 0 | Status: SHIPPED | OUTPATIENT
Start: 2020-02-03 | End: 2020-08-03

## 2020-02-03 RX ORDER — FLUCONAZOLE 150 MG/1
150 TABLET ORAL ONCE
Qty: 1 TABLET | Refills: 2 | Status: SHIPPED | OUTPATIENT
Start: 2020-02-03 | End: 2020-02-03

## 2020-02-03 NOTE — PATIENT INSTRUCTIONS
Sinusitis, Adult  Sinusitis is inflammation of your sinuses. Sinuses are hollow spaces in the bones around your face. Your sinuses are located:  · Around your eyes.  · In the middle of your forehead.  · Behind your nose.  · In your cheekbones.  Mucus normally drains out of your sinuses. When your nasal tissues become inflamed or swollen, mucus can become trapped or blocked. This allows bacteria, viruses, and fungi to grow, which leads to infection. Most infections of the sinuses are caused by a virus.  Sinusitis can develop quickly. It can last for up to 4 weeks (acute) or for more than 12 weeks (chronic). Sinusitis often develops after a cold.  What are the causes?  This condition is caused by anything that creates swelling in the sinuses or stops mucus from draining. This includes:  · Allergies.  · Asthma.  · Infection from bacteria or viruses.  · Deformities or blockages in your nose or sinuses.  · Abnormal growths in the nose (nasal polyps).  · Pollutants, such as chemicals or irritants in the air.  · Infection from fungi (rare).  What increases the risk?  You are more likely to develop this condition if you:  · Have a weak body defense system (immune system).  · Do a lot of swimming or diving.  · Overuse nasal sprays.  · Smoke.  What are the signs or symptoms?  The main symptoms of this condition are pain and a feeling of pressure around the affected sinuses. Other symptoms include:  · Stuffy nose or congestion.  · Thick drainage from your nose.  · Swelling and warmth over the affected sinuses.  · Headache.  · Upper toothache.  · A cough that may get worse at night.  · Extra mucus that collects in the throat or the back of the nose (postnasal drip).  · Decreased sense of smell and taste.  · Fatigue.  · A fever.  · Sore throat.  · Bad breath.  How is this diagnosed?  This condition is diagnosed based on:  · Your symptoms.  · Your medical history.  · A physical exam.  · Tests to find out if your condition is  acute or chronic. This may include:  ? Checking your nose for nasal polyps.  ? Viewing your sinuses using a device that has a light (endoscope).  ? Testing for allergies or bacteria.  ? Imaging tests, such as an MRI or CT scan.  In rare cases, a bone biopsy may be done to rule out more serious types of fungal sinus disease.  How is this treated?  Treatment for sinusitis depends on the cause and whether your condition is chronic or acute.  · If caused by a virus, your symptoms should go away on their own within 10 days. You may be given medicines to relieve symptoms. They include:  ? Medicines that shrink swollen nasal passages (topical intranasal decongestants).  ? Medicines that treat allergies (antihistamines).  ? A spray that eases inflammation of the nostrils (topical intranasal corticosteroids).  ? Rinses that help get rid of thick mucus in your nose (nasal saline washes).  · If caused by bacteria, your health care provider may recommend waiting to see if your symptoms improve. Most bacterial infections will get better without antibiotic medicine. You may be given antibiotics if you have:  ? A severe infection.  ? A weak immune system.  · If caused by narrow nasal passages or nasal polyps, you may need to have surgery.  Follow these instructions at home:  Medicines  · Take, use, or apply over-the-counter and prescription medicines only as told by your health care provider. These may include nasal sprays.  · If you were prescribed an antibiotic medicine, take it as told by your health care provider. Do not stop taking the antibiotic even if you start to feel better.  Hydrate and humidify    · Drink enough fluid to keep your urine pale yellow. Staying hydrated will help to thin your mucus.  · Use a cool mist humidifier to keep the humidity level in your home above 50%.  · Inhale steam for 10-15 minutes, 3-4 times a day, or as told by your health care provider. You can do this in the bathroom while a hot shower is  running.  · Limit your exposure to cool or dry air.  Rest  · Rest as much as possible.  · Sleep with your head raised (elevated).  · Make sure you get enough sleep each night.  General instructions    · Apply a warm, moist washcloth to your face 3-4 times a day or as told by your health care provider. This will help with discomfort.  · Wash your hands often with soap and water to reduce your exposure to germs. If soap and water are not available, use hand .  · Do not smoke. Avoid being around people who are smoking (secondhand smoke).  · Keep all follow-up visits as told by your health care provider. This is important.  Contact a health care provider if:  · You have a fever.  · Your symptoms get worse.  · Your symptoms do not improve within 10 days.  Get help right away if:  · You have a severe headache.  · You have persistent vomiting.  · You have severe pain or swelling around your face or eyes.  · You have vision problems.  · You develop confusion.  · Your neck is stiff.  · You have trouble breathing.  Summary  · Sinusitis is soreness and inflammation of your sinuses. Sinuses are hollow spaces in the bones around your face.  · This condition is caused by nasal tissues that become inflamed or swollen. The swelling traps or blocks the flow of mucus. This allows bacteria, viruses, and fungi to grow, which leads to infection.  · If you were prescribed an antibiotic medicine, take it as told by your health care provider. Do not stop taking the antibiotic even if you start to feel better.  · Keep all follow-up visits as told by your health care provider. This is important.  This information is not intended to replace advice given to you by your health care provider. Make sure you discuss any questions you have with your health care provider.  Document Released: 12/18/2006 Document Revised: 05/20/2019 Document Reviewed: 05/20/2019  [employer] Interactive Patient Education © 2019 [employer]

## 2020-02-03 NOTE — PROGRESS NOTES
Subjective   Farzana Sky is a 44 y.o. female.     History of Present Illness   Farzana Sky 44 y.o. female presents for evaluation of new h/a. Symptoms began about a few weeks ago. Generally, the headaches has been present for 3 weeks mainly on the right side but also some aching on the left .the headaches is new; temples and retro orbital. But it can hurt to touch eye orbit. R>L. This is much improved today.  The location of the headache pain is bilat R>L retro orbital and temples right maxillary and right frontal sinus. Right parietal.  Sometimes pain is worse with movement. . Recently, the headaches have been increasing in frequency. Work attendance or other daily activities are not affected by the headaches. Precipitating factors include: none which have been determined. Phonophobia just to first go outside or to bright light. The headaches are usually not preceded by an aura. Associated symptoms include no worsening of pain with movement and no N/V, no phonophobia. The patient denies muscle weakness, numbness of extremities and speech difficulties. Home treatment has included ibuprofen with marked improvement. Other history includes: none . Family history includes no known family members with significant headaches.  Prior therapies tried include OTC analgesics with relief great help if takes it.  No recent illness; no fever  Bag of fluid under eyes twice; R>L  Eye pain and pressure  Sore to touch eye right eye orbit and some on left  Onset 3 weeks ago  Can get h/a in temples  Helps to close eyes  Some blurred vision when eyes tired  Advil helped    Want her to see ophthalmologist ASAP  Dr. Zaina Traore MD  CT sinus urgent; make sure no mass; ? infx  The following portions of the patient's history were reviewed and updated as appropriate: allergies, current medications, past family history, past medical history, past social history, past surgical history and problem list.    Review of Systems   Constitutional:  Negative for activity change, appetite change and unexpected weight change.   HENT: Positive for sinus pain. Negative for nosebleeds and trouble swallowing.    Eyes: Positive for photophobia and itching. Negative for pain and visual disturbance.   Respiratory: Negative for chest tightness, shortness of breath and wheezing.    Cardiovascular: Negative for chest pain and palpitations.   Gastrointestinal: Negative for abdominal pain and blood in stool.   Endocrine: Negative.    Genitourinary: Negative for difficulty urinating and hematuria.   Musculoskeletal: Negative for joint swelling.   Skin: Negative for color change and rash.   Allergic/Immunologic: Negative.    Neurological: Negative for syncope and speech difficulty.   Hematological: Negative for adenopathy.   Psychiatric/Behavioral: Negative for agitation and confusion.   All other systems reviewed and are negative.      Objective   Physical Exam   Constitutional: She is oriented to person, place, and time. She appears well-developed and well-nourished.  Non-toxic appearance. No distress.   HENT:   Head: Normocephalic and atraumatic. Hair is normal.   Right Ear: External ear normal. No drainage, swelling or tenderness. Tympanic membrane is retracted.   Left Ear: External ear normal. No drainage, swelling or tenderness. Tympanic membrane is retracted.   Nose: Mucosal edema present. No epistaxis.   Mouth/Throat: Uvula is midline and mucous membranes are normal. No oral lesions. No uvula swelling. Posterior oropharyngeal erythema present. No oropharyngeal exudate.   Scarring right TM  She does have some postnasal drainage in the back of her throat  She is tender right orbital area above below medial, no edema, no rash not hot not red, no eye discharge, tear duct not inflamed or enlarged, tender maxillary sinus and frontal sinus right  Some pressure on the left frontal and maxillary sinus   Eyes: Pupils are equal, round, and reactive to light. Conjunctivae and EOM  are normal. Right eye exhibits no discharge. Left eye exhibits no discharge. No scleral icterus.   I can see her optic disc and it looks normal bilateral   Neck: Normal range of motion. Neck supple.   Cardiovascular: Normal rate, regular rhythm and normal heart sounds. Exam reveals no gallop.   No murmur heard.  Pulmonary/Chest: Breath sounds normal. No stridor. No respiratory distress. She has no wheezes. She has no rales. She exhibits no tenderness.   Abdominal: There is tenderness.   Lymphadenopathy:     She has no cervical adenopathy.   Neurological: She is alert and oriented to person, place, and time. No cranial nerve deficit. She exhibits normal muscle tone.   I did check cranial nerves and she did have pain with eye range of motion   Skin: Skin is warm and dry. No rash noted. She is not diaphoretic.   Psychiatric: She has a normal mood and affect. Her behavior is normal. Judgment and thought content normal.   Nursing note and vitals reviewed.      Assessment/Plan   Farzana was seen today for pain.    Diagnoses and all orders for this visit:    Acute sinusitis, recurrence not specified, unspecified location  -     CT Sinus Without Contrast; Future  -     Ambulatory Referral to Ophthalmology    Orbital pain, right  -     CT Sinus Without Contrast; Future  -     Ambulatory Referral to Ophthalmology    Other orders  -     amoxicillin-clavulanate (AUGMENTIN) 875-125 MG per tablet; Take 1 tablet by mouth Every 12 (Twelve) Hours. One PO BID for infection with food  -     predniSONE (DELTASONE) 20 MG tablet; Take 1 tablet by mouth 2 (Two) Times a Day. With food for 5 days  -     fluconazole (DIFLUCAN) 150 MG tablet; Take 1 tablet by mouth 1 (One) Time for 1 dose. One PO X 1 for yeast infection    I discussed this history and plan with Dr. Martinez and he were in agreement to start her on some oral prednisone to see if it helps inflammation and some Augmentin to cover sinus infection  Need her to get in with ophthalmology  as soon as possible measure eye pressures, do a thorough eye exam  I also ordered an urgent CT of the sinus bilateral noncontrast, when approved if there is infection present and make sure there is not a mass

## 2020-02-05 ENCOUNTER — HOSPITAL ENCOUNTER (OUTPATIENT)
Dept: CT IMAGING | Facility: HOSPITAL | Age: 45
Discharge: HOME | End: 2020-02-05
Admitting: PHYSICIAN ASSISTANT

## 2020-02-05 DIAGNOSIS — H57.11 ORBITAL PAIN, RIGHT: ICD-10-CM

## 2020-02-05 DIAGNOSIS — J01.90 ACUTE SINUSITIS, RECURRENCE NOT SPECIFIED, UNSPECIFIED LOCATION: ICD-10-CM

## 2020-02-05 PROCEDURE — 70486 CT MAXILLOFACIAL W/O DYE: CPT

## 2020-02-07 ENCOUNTER — APPOINTMENT (OUTPATIENT)
Dept: CT IMAGING | Facility: HOSPITAL | Age: 45
End: 2020-02-07

## 2020-02-10 NOTE — PROGRESS NOTES
Left message advising pt of results, SHARI on file. Asking pt to call and let us know if she is sty having symptoms

## 2020-08-03 ENCOUNTER — HOSPITAL ENCOUNTER (OUTPATIENT)
Dept: CT IMAGING | Facility: HOSPITAL | Age: 45
Discharge: HOME | End: 2020-08-03
Admitting: NURSE PRACTITIONER

## 2020-08-03 ENCOUNTER — OFFICE VISIT (OUTPATIENT)
Dept: FAMILY MEDICINE CLINIC | Facility: CLINIC | Age: 45
End: 2020-08-03

## 2020-08-03 VITALS
WEIGHT: 124 LBS | TEMPERATURE: 97.6 F | HEIGHT: 64 IN | BODY MASS INDEX: 21.17 KG/M2 | RESPIRATION RATE: 16 BRPM | DIASTOLIC BLOOD PRESSURE: 76 MMHG | SYSTOLIC BLOOD PRESSURE: 120 MMHG | OXYGEN SATURATION: 98 % | HEART RATE: 67 BPM

## 2020-08-03 DIAGNOSIS — R10.84 COLICKY ABDOMINAL PAIN: Primary | ICD-10-CM

## 2020-08-03 PROCEDURE — 74177 CT ABD & PELVIS W/CONTRAST: CPT

## 2020-08-03 PROCEDURE — 25010000002 IOPAMIDOL 61 % SOLUTION: Performed by: NURSE PRACTITIONER

## 2020-08-03 PROCEDURE — 99214 OFFICE O/P EST MOD 30 MIN: CPT | Performed by: NURSE PRACTITIONER

## 2020-08-03 RX ADMIN — IOPAMIDOL 85 ML: 612 INJECTION, SOLUTION INTRAVENOUS at 17:51

## 2020-08-03 NOTE — NURSING NOTE
CT A&P stat hold & call. Patient may d/c home per Ciera DRUMMOND. IV d/c/d intact. No problems or concerns noted at this time. Patient and nurse with appropriate PPE in place. Patient ambulated to main entrance for D/C.

## 2020-08-03 NOTE — PROGRESS NOTES
Subjective   Farzana Sky is a 44 y.o. female.     History of Present Illness   Farzana Sky 44 y.o. female who presents for evaluation of nausea, abdominal pain and diarrhea and reports having a few stools in the last 24 hours. Symptoms have been present for 10 days .  The condition is aggravated by eating any food . she is experiencing aching pain in right to mid upper abdomen.  Was initially colicky but now constant.  Alleviating factors are not eating with some help, but still symptoms . Patient denies fever, chills, dyschezia, melena and vomiting her past medical history is notable for Diverticultis .  Patient denies recent travel.  Patient has been treated in the past for diverticulitis but has never had CT abdomen. She states she had colonoscopy 14 years ago but is unsure if she had diverticulosis. This is her 3rd episode of similar symptoms which has been on the right side.  Had cholecystectomy previously.         The following portions of the patient's history were reviewed and updated as appropriate: allergies, current medications, past family history, past medical history, past social history, past surgical history and problem list.    Review of Systems   Constitutional: Negative for chills and fever.   Gastrointestinal: Positive for abdominal pain, diarrhea and nausea. Negative for abdominal distention, anal bleeding, blood in stool, constipation, rectal pain and vomiting.       Objective   Physical Exam   Constitutional: She is oriented to person, place, and time. She appears well-developed and well-nourished.   Pulmonary/Chest: Effort normal.   Abdominal: Normal appearance and bowel sounds are normal. There is no rigidity, no rebound, no guarding, no tenderness at McBurney's point and negative Mcdermott's sign.       Neurological: She is alert and oriented to person, place, and time.   Psychiatric: She has a normal mood and affect. Her behavior is normal. Judgment and thought content normal.   Nursing note  and vitals reviewed.      Assessment/Plan   Farzana was seen today for abdominal pain.    Diagnoses and all orders for this visit:    Colicky abdominal pain  -     Cancel: CT Abdomen Pelvis With Contrast  -     CT Abdomen Pelvis With Contrast

## 2020-10-13 ENCOUNTER — APPOINTMENT (OUTPATIENT)
Dept: WOMENS IMAGING | Facility: HOSPITAL | Age: 45
End: 2020-10-13

## 2020-10-13 PROCEDURE — 77067 SCR MAMMO BI INCL CAD: CPT | Performed by: RADIOLOGY

## 2020-10-13 PROCEDURE — 77063 BREAST TOMOSYNTHESIS BI: CPT | Performed by: RADIOLOGY

## 2020-12-17 DIAGNOSIS — Z82.49 FAMILY HISTORY OF FIRST-DEGREE RELATIVE WITH CARDIOMYOPATHY: Primary | ICD-10-CM

## 2020-12-23 ENCOUNTER — HOSPITAL ENCOUNTER (OUTPATIENT)
Dept: CARDIOLOGY | Facility: HOSPITAL | Age: 45
Discharge: HOME | End: 2020-12-23
Admitting: PHYSICIAN ASSISTANT

## 2020-12-23 VITALS
OXYGEN SATURATION: 99 % | SYSTOLIC BLOOD PRESSURE: 110 MMHG | HEIGHT: 65 IN | BODY MASS INDEX: 19.99 KG/M2 | DIASTOLIC BLOOD PRESSURE: 70 MMHG | HEART RATE: 76 BPM | WEIGHT: 120 LBS

## 2020-12-23 DIAGNOSIS — Z82.49 FAMILY HISTORY OF FIRST-DEGREE RELATIVE WITH CARDIOMYOPATHY: ICD-10-CM

## 2020-12-23 PROCEDURE — 93306 TTE W/DOPPLER COMPLETE: CPT | Performed by: INTERNAL MEDICINE

## 2020-12-23 PROCEDURE — 93306 TTE W/DOPPLER COMPLETE: CPT

## 2020-12-24 LAB
AORTIC ARCH: 2 CM
ASCENDING AORTA: 2.6 CM
BH CV ECHO MEAS - ACS: 1.8 CM
BH CV ECHO MEAS - AO MAX PG (FULL): 3.2 MMHG
BH CV ECHO MEAS - AO MAX PG: 7 MMHG
BH CV ECHO MEAS - AO MEAN PG (FULL): 2.1 MMHG
BH CV ECHO MEAS - AO MEAN PG: 4.1 MMHG
BH CV ECHO MEAS - AO ROOT AREA (BSA CORRECTED): 1.6
BH CV ECHO MEAS - AO ROOT AREA: 4.9 CM^2
BH CV ECHO MEAS - AO ROOT DIAM: 2.5 CM
BH CV ECHO MEAS - AO V2 MAX: 132.7 CM/SEC
BH CV ECHO MEAS - AO V2 MEAN: 93.6 CM/SEC
BH CV ECHO MEAS - AO V2 VTI: 28.7 CM
BH CV ECHO MEAS - ASC AORTA: 2.6 CM
BH CV ECHO MEAS - AVA(I,A): 1.9 CM^2
BH CV ECHO MEAS - AVA(I,D): 1.9 CM^2
BH CV ECHO MEAS - AVA(V,A): 2 CM^2
BH CV ECHO MEAS - AVA(V,D): 2 CM^2
BH CV ECHO MEAS - BSA(HAYCOCK): 1.6 M^2
BH CV ECHO MEAS - BSA: 1.6 M^2
BH CV ECHO MEAS - BZI_BMI: 20 KILOGRAMS/M^2
BH CV ECHO MEAS - BZI_METRIC_HEIGHT: 165.1 CM
BH CV ECHO MEAS - BZI_METRIC_WEIGHT: 54.4 KG
BH CV ECHO MEAS - EDV(MOD-SP2): 64 ML
BH CV ECHO MEAS - EDV(MOD-SP4): 56 ML
BH CV ECHO MEAS - EDV(TEICH): 67.9 ML
BH CV ECHO MEAS - EF(CUBED): 75.2 %
BH CV ECHO MEAS - EF(MOD-BP): 61.1 %
BH CV ECHO MEAS - EF(MOD-SP2): 65.6 %
BH CV ECHO MEAS - EF(MOD-SP4): 62.5 %
BH CV ECHO MEAS - EF(TEICH): 67.7 %
BH CV ECHO MEAS - ESV(MOD-SP2): 22 ML
BH CV ECHO MEAS - ESV(MOD-SP4): 21 ML
BH CV ECHO MEAS - ESV(TEICH): 21.9 ML
BH CV ECHO MEAS - FS: 37.2 %
BH CV ECHO MEAS - IVS/LVPW: 0.79
BH CV ECHO MEAS - IVSD: 0.73 CM
BH CV ECHO MEAS - LAT PEAK E' VEL: 14.9 CM/SEC
BH CV ECHO MEAS - LV DIASTOLIC VOL/BSA (35-75): 35.2 ML/M^2
BH CV ECHO MEAS - LV MASS(C)D: 95.5 GRAMS
BH CV ECHO MEAS - LV MASS(C)DI: 60 GRAMS/M^2
BH CV ECHO MEAS - LV MAX PG: 3.9 MMHG
BH CV ECHO MEAS - LV MEAN PG: 2 MMHG
BH CV ECHO MEAS - LV SYSTOLIC VOL/BSA (12-30): 13.2 ML/M^2
BH CV ECHO MEAS - LV V1 MAX: 98.5 CM/SEC
BH CV ECHO MEAS - LV V1 MEAN: 65.8 CM/SEC
BH CV ECHO MEAS - LV V1 VTI: 20.9 CM
BH CV ECHO MEAS - LVIDD: 3.9 CM
BH CV ECHO MEAS - LVIDS: 2.5 CM
BH CV ECHO MEAS - LVLD AP2: 7.3 CM
BH CV ECHO MEAS - LVLD AP4: 7.3 CM
BH CV ECHO MEAS - LVLS AP2: 5.9 CM
BH CV ECHO MEAS - LVLS AP4: 6.9 CM
BH CV ECHO MEAS - LVOT AREA (M): 2.5 CM^2
BH CV ECHO MEAS - LVOT AREA: 2.7 CM^2
BH CV ECHO MEAS - LVOT DIAM: 1.8 CM
BH CV ECHO MEAS - LVPWD: 0.92 CM
BH CV ECHO MEAS - MED PEAK E' VEL: 14.1 CM/SEC
BH CV ECHO MEAS - MR MAX PG: 96.5 MMHG
BH CV ECHO MEAS - MR MAX VEL: 491.2 CM/SEC
BH CV ECHO MEAS - MV A DUR: 0.08 SEC
BH CV ECHO MEAS - MV A MAX VEL: 55.2 CM/SEC
BH CV ECHO MEAS - MV DEC SLOPE: 386.6 CM/SEC^2
BH CV ECHO MEAS - MV DEC TIME: 0.18 SEC
BH CV ECHO MEAS - MV E MAX VEL: 90.8 CM/SEC
BH CV ECHO MEAS - MV E/A: 1.6
BH CV ECHO MEAS - MV MAX PG: 4 MMHG
BH CV ECHO MEAS - MV MEAN PG: 1.8 MMHG
BH CV ECHO MEAS - MV P1/2T MAX VEL: 99.9 CM/SEC
BH CV ECHO MEAS - MV P1/2T: 75.7 MSEC
BH CV ECHO MEAS - MV V2 MAX: 99.9 CM/SEC
BH CV ECHO MEAS - MV V2 MEAN: 63.8 CM/SEC
BH CV ECHO MEAS - MV V2 VTI: 26.6 CM
BH CV ECHO MEAS - MVA P1/2T LCG: 2.2 CM^2
BH CV ECHO MEAS - MVA(P1/2T): 2.9 CM^2
BH CV ECHO MEAS - MVA(VTI): 2.1 CM^2
BH CV ECHO MEAS - PA ACC TIME: 0.09 SEC
BH CV ECHO MEAS - PA MAX PG (FULL): 2.2 MMHG
BH CV ECHO MEAS - PA MAX PG: 4.3 MMHG
BH CV ECHO MEAS - PA PR(ACCEL): 37.4 MMHG
BH CV ECHO MEAS - PA V2 MAX: 103.2 CM/SEC
BH CV ECHO MEAS - PULM A REVS DUR: 0.08 SEC
BH CV ECHO MEAS - PULM A REVS VEL: 34.3 CM/SEC
BH CV ECHO MEAS - PULM DIAS VEL: 54.8 CM/SEC
BH CV ECHO MEAS - PULM S/D: 1.1
BH CV ECHO MEAS - PULM SYS VEL: 57.8 CM/SEC
BH CV ECHO MEAS - PVA(V,A): 1.9 CM^2
BH CV ECHO MEAS - PVA(V,D): 1.9 CM^2
BH CV ECHO MEAS - QP/QS: 0.84
BH CV ECHO MEAS - RAP SYSTOLE: 3 MMHG
BH CV ECHO MEAS - RV MAX PG: 2.1 MMHG
BH CV ECHO MEAS - RV MEAN PG: 1.1 MMHG
BH CV ECHO MEAS - RV V1 MAX: 72 CM/SEC
BH CV ECHO MEAS - RV V1 MEAN: 48.8 CM/SEC
BH CV ECHO MEAS - RV V1 VTI: 16.6 CM
BH CV ECHO MEAS - RVOT AREA: 2.8 CM^2
BH CV ECHO MEAS - RVOT DIAM: 1.9 CM
BH CV ECHO MEAS - RVSP: 23 MMHG
BH CV ECHO MEAS - SI(AO): 88.2 ML/M^2
BH CV ECHO MEAS - SI(CUBED): 29.1 ML/M^2
BH CV ECHO MEAS - SI(LVOT): 34.7 ML/M^2
BH CV ECHO MEAS - SI(MOD-SP2): 26.4 ML/M^2
BH CV ECHO MEAS - SI(MOD-SP4): 22 ML/M^2
BH CV ECHO MEAS - SI(TEICH): 28.9 ML/M^2
BH CV ECHO MEAS - SUP REN AO DIAM: 1.8 CM
BH CV ECHO MEAS - SV(AO): 140.4 ML
BH CV ECHO MEAS - SV(CUBED): 46.3 ML
BH CV ECHO MEAS - SV(LVOT): 55.3 ML
BH CV ECHO MEAS - SV(MOD-SP2): 42 ML
BH CV ECHO MEAS - SV(MOD-SP4): 35 ML
BH CV ECHO MEAS - SV(RVOT): 46.4 ML
BH CV ECHO MEAS - SV(TEICH): 45.9 ML
BH CV ECHO MEAS - TAPSE (>1.6): 2.3 CM
BH CV ECHO MEAS - TR MAX VEL: 221.3 CM/SEC
BH CV ECHO MEASUREMENTS AVERAGE E/E' RATIO: 6.26
BH CV VAS BP RIGHT ARM: NORMAL MMHG
BH CV XLRA - RV BASE: 2.6 CM
BH CV XLRA - RV LENGTH: 7 CM
BH CV XLRA - RV MID: 3.1 CM
BH CV XLRA - TDI S': 12.6 CM/SEC
LEFT ATRIUM VOLUME INDEX: 22 ML/M2
MAXIMAL PREDICTED HEART RATE: 176 BPM
SINUS: 2.3 CM
STJ: 2.4 CM
STRESS TARGET HR: 150 BPM

## 2021-01-06 ENCOUNTER — IMMUNIZATION (OUTPATIENT)
Dept: VACCINE CLINIC | Facility: HOSPITAL | Age: 46
End: 2021-01-06

## 2021-01-06 PROCEDURE — 0001A: CPT | Performed by: INTERNAL MEDICINE

## 2021-01-06 PROCEDURE — 91300 HC SARSCOV02 VAC 30MCG/0.3ML IM: CPT | Performed by: INTERNAL MEDICINE

## 2021-01-27 ENCOUNTER — IMMUNIZATION (OUTPATIENT)
Dept: VACCINE CLINIC | Facility: HOSPITAL | Age: 46
End: 2021-01-27

## 2021-01-27 PROCEDURE — 0002A: CPT | Performed by: INTERNAL MEDICINE

## 2021-01-27 PROCEDURE — 91300 HC SARSCOV02 VAC 30MCG/0.3ML IM: CPT | Performed by: INTERNAL MEDICINE

## 2021-10-27 ENCOUNTER — APPOINTMENT (OUTPATIENT)
Dept: WOMENS IMAGING | Facility: HOSPITAL | Age: 46
End: 2021-10-27

## 2021-10-27 PROCEDURE — 77067 SCR MAMMO BI INCL CAD: CPT | Performed by: RADIOLOGY

## 2021-10-27 PROCEDURE — 77063 BREAST TOMOSYNTHESIS BI: CPT | Performed by: RADIOLOGY

## 2021-12-21 ENCOUNTER — IMMUNIZATION (OUTPATIENT)
Dept: VACCINE CLINIC | Facility: HOSPITAL | Age: 46
End: 2021-12-21

## 2021-12-21 PROCEDURE — 91300 HC SARSCOV02 VAC 30MCG/0.3ML IM: CPT | Performed by: INTERNAL MEDICINE

## 2021-12-21 PROCEDURE — 0004A HC ADM SARSCOV2 30MCG/0.3ML BOOSTER: CPT | Performed by: INTERNAL MEDICINE

## 2022-10-31 ENCOUNTER — APPOINTMENT (OUTPATIENT)
Dept: WOMENS IMAGING | Facility: HOSPITAL | Age: 47
End: 2022-10-31

## 2022-10-31 PROCEDURE — 77067 SCR MAMMO BI INCL CAD: CPT | Performed by: RADIOLOGY

## 2022-10-31 PROCEDURE — 77063 BREAST TOMOSYNTHESIS BI: CPT | Performed by: RADIOLOGY

## 2023-02-02 ENCOUNTER — OFFICE VISIT (OUTPATIENT)
Dept: FAMILY MEDICINE CLINIC | Facility: CLINIC | Age: 48
End: 2023-02-02
Payer: COMMERCIAL

## 2023-02-02 VITALS
RESPIRATION RATE: 16 BRPM | HEART RATE: 67 BPM | WEIGHT: 127.8 LBS | OXYGEN SATURATION: 99 % | DIASTOLIC BLOOD PRESSURE: 62 MMHG | SYSTOLIC BLOOD PRESSURE: 110 MMHG | TEMPERATURE: 97.5 F | HEIGHT: 65 IN | BODY MASS INDEX: 21.29 KG/M2

## 2023-02-02 DIAGNOSIS — Z12.11 SCREEN FOR COLON CANCER: Primary | ICD-10-CM

## 2023-02-02 DIAGNOSIS — L65.9 HAIR LOSS: ICD-10-CM

## 2023-02-02 PROBLEM — I34.1 MITRAL VALVE PROLAPSE: Status: ACTIVE | Noted: 2023-02-02

## 2023-02-02 PROBLEM — I34.0 MILD MITRAL REGURGITATION BY PRIOR ECHOCARDIOGRAM: Status: ACTIVE | Noted: 2023-02-02

## 2023-02-02 PROCEDURE — 99213 OFFICE O/P EST LOW 20 MIN: CPT | Performed by: PHYSICIAN ASSISTANT

## 2023-02-02 NOTE — PROGRESS NOTES
"Subjective   Farzana Sky is a 47 y.o. female.     History of Present Illness     Farzana Sky 47 y.o. female /62 (BP Location: Left arm, Patient Position: Sitting, Cuff Size: Adult)   Pulse 67   Temp 97.5 °F (36.4 °C) (Oral)   Resp 16   Ht 165.1 cm (65\")   Wt 58 kg (127 lb 12.8 oz)   SpO2 99%   BMI 21.27 kg/m²  who presents today for hair loss. Also will need f/u echo age 51  she has a history of   Patient Active Problem List   Diagnosis   • Palpitation   • Retrocalcaneal bursitis, unspecified laterality   • Achilles tendonitis, bilateral   • Plantar fasciitis   • Family history of first-degree relative with cardiomyopathy   .          Had lab 10/24/2022 noting free T41.0.,  Lipid panel cholesterol total is 175, HDL 70, triglycerides 64, LDL 90, hemoglobin A1c 5.0, CMP with glucose 82, creatinine 0.72, EGFR 105, sodium 136, potassium 4.1, calcium 9.1, AST 18, ALT 14, total bilirubin 0.5, albumin 4.4 protein 7, CBC with hemoglobin 13.6, white blood cell count 4.7, platelet count 211, normal differential,   Prior TSH noted 10/28/2021 at 2.4    Did d/w Dr Hinojosa about echo  Wears compression stockings    Echo 12-  Interpretation Summary    • Calculated left ventricular EF = 61.1% Estimated left ventricular EF was in agreement with the calculated left ventricular EF. Left ventricular systolic function is normal.  • Left ventricular diastolic function was normal.  • There is mild mitral valve prolapse of the posterior mitral leaflet.  • Mild mitral valve regurgitation is present  • Mild tricuspid valve regurgitation is present.  • Estimated right ventricular systolic pressure from tricuspid regurgitation is normal (<35 mmHg).  • Interatrial septal aneurysm present.  • Saline test results are positive but saline is seen very late. Can not exclude a shunt  She is to repeat echo 5 yr---age 51  Saw vascular 3-22-22 varicosity tx    No fam hx colon cancer--need colon cancer screen  Hair loss---had " thyroid checked  Stress level managed  She does exercise  Some weight gain.    The following portions of the patient's history were reviewed and updated as appropriate: allergies, current medications, past family history, past medical history, past social history, past surgical history and problem list.    Review of Systems   Constitutional: Negative for activity change, appetite change and unexpected weight change.   HENT: Negative for nosebleeds and trouble swallowing.    Eyes: Negative for pain and visual disturbance.   Respiratory: Negative for chest tightness, shortness of breath and wheezing.    Cardiovascular: Negative for chest pain and palpitations.   Gastrointestinal: Negative for abdominal pain and blood in stool.   Endocrine: Negative.    Genitourinary: Negative for difficulty urinating and hematuria.   Musculoskeletal: Negative for joint swelling.   Skin: Negative for color change and rash.   Allergic/Immunologic: Negative.    Neurological: Negative for syncope and speech difficulty.   Hematological: Negative for adenopathy.   Psychiatric/Behavioral: Negative for agitation and confusion.   All other systems reviewed and are negative.      Objective   Physical Exam  Vitals and nursing note reviewed.   Constitutional:       General: She is not in acute distress.     Appearance: Normal appearance. She is well-developed. She is not ill-appearing or toxic-appearing.   HENT:      Head: Normocephalic.      Right Ear: External ear normal.      Left Ear: External ear normal.      Nose: Nose normal.      Mouth/Throat:      Pharynx: Oropharynx is clear.   Eyes:      General: No scleral icterus.     Conjunctiva/sclera: Conjunctivae normal.      Pupils: Pupils are equal, round, and reactive to light.   Neck:      Thyroid: No thyromegaly.   Cardiovascular:      Rate and Rhythm: Normal rate and regular rhythm.      Heart sounds: Normal heart sounds. No murmur heard.  Pulmonary:      Effort: Pulmonary effort is normal.  No respiratory distress.      Breath sounds: Normal breath sounds.   Musculoskeletal:         General: No deformity. Normal range of motion.      Cervical back: Normal range of motion and neck supple.   Skin:     General: Skin is warm and dry.      Findings: No rash.   Neurological:      General: No focal deficit present.      Mental Status: She is alert and oriented to person, place, and time. Mental status is at baseline.   Psychiatric:         Mood and Affect: Mood normal.         Behavior: Behavior normal.         Thought Content: Thought content normal.         Judgment: Judgment normal.         Assessment & Plan   Diagnoses and all orders for this visit:    1. Screen for colon cancer (Primary)  -     Cologuard - Stool, Per Rectum; Future      Get TSH----hair loss  Cologuard       Answers for HPI/ROS submitted by the patient on 1/31/2023  Please describe your symptoms.: Would like to discuss menapause issues, hair loss, and get a general check up.  Have you had these symptoms before?: Yes  How long have you been having these symptoms?: Greater than 2 weeks  Please list any medications you are currently taking for this condition.: Imvexxy  What is the primary reason for your visit?: Other

## 2023-02-03 LAB — TSH SERPL DL<=0.005 MIU/L-ACNC: 2.8 UIU/ML (ref 0.27–4.2)

## 2024-03-26 ENCOUNTER — PROCEDURE VISIT (OUTPATIENT)
Age: 49
End: 2024-03-26
Payer: COMMERCIAL

## 2024-03-26 VITALS
HEIGHT: 65 IN | WEIGHT: 128 LBS | DIASTOLIC BLOOD PRESSURE: 72 MMHG | BODY MASS INDEX: 21.33 KG/M2 | SYSTOLIC BLOOD PRESSURE: 124 MMHG

## 2024-03-26 DIAGNOSIS — I78.1 SPIDER VEINS: Primary | ICD-10-CM

## 2024-03-26 NOTE — PROGRESS NOTES
Procedure   Venous Sclerotherapy    Date/Time: 3/26/2024 3:53 PM    Performed by: Delphine Cano APRN  Authorized by: Delphine Cano APRN    Procedure:  Right single/ multiple injections of sclerosant, spider veins; limb or trunk and Left single/ multiple injections of sclerosant, spider veins; limb or trunk  Indications: spider veins    The procedure was performed for cosmetic reasons.      Risks discussed:  Anaphylaxis, bleeding, deep venous thrombosis, failure to clear vein, hyper/ hypopigmentation, matting, neuropathy, infection, parethesias, skin staining, ulceration, stroke, poor cosmetic result and pain    Anesthesia method:  None    Preparation: Patient was prepped and draped in usual sterile fashion      Sclerosant: polidocanol    Polidocanol concentration:  0.6%  Sclerosant amount (ml):  7  Specimen:  None    Procedure Description:   The skin was prepped with alcohol. A 32 gauge needle was placed in the varix on negative tension and then confirmed in position. The sclerosing solution was injected with confirmation of intravenous position with a low pressure injection. There was appropriate blanching of the varices. Cotton balls and paper tape were placed over the injection sites.       Procedure completion:  Tolerated well, no complications  Dressing: cotton balls and paper tape applied

## 2024-12-31 ENCOUNTER — TELEPHONE (OUTPATIENT)
Dept: CARDIOLOGY | Facility: HOSPITAL | Age: 49
End: 2024-12-31
Payer: COMMERCIAL

## 2024-12-31 DIAGNOSIS — R07.2 PRECORDIAL PAIN: Primary | ICD-10-CM

## 2025-01-02 ENCOUNTER — TELEPHONE (OUTPATIENT)
Dept: CARDIOLOGY | Facility: HOSPITAL | Age: 50
End: 2025-01-02

## 2025-01-02 NOTE — TELEPHONE ENCOUNTER
PT screened for test CTA. Screening checklist sheet completed. Rhythm strip obtained and PO Metoprolol given to patient to take the night before and the day of CTA. Informed patient to hold allergy meds/ antihistamines 24 hours prior to test.

## 2025-01-09 ENCOUNTER — HOSPITAL ENCOUNTER (OUTPATIENT)
Dept: CARDIOLOGY | Facility: HOSPITAL | Age: 50
Discharge: HOME | End: 2025-01-09

## 2025-01-09 ENCOUNTER — HOSPITAL ENCOUNTER (OUTPATIENT)
Dept: CT IMAGING | Facility: HOSPITAL | Age: 50
Discharge: HOME | End: 2025-01-09

## 2025-01-09 VITALS
HEIGHT: 65 IN | WEIGHT: 125 LBS | BODY MASS INDEX: 20.83 KG/M2 | HEART RATE: 56 BPM | SYSTOLIC BLOOD PRESSURE: 111 MMHG | DIASTOLIC BLOOD PRESSURE: 69 MMHG

## 2025-01-09 DIAGNOSIS — R00.2 PALPITATION: Primary | ICD-10-CM

## 2025-01-09 DIAGNOSIS — R07.2 PRECORDIAL PAIN: ICD-10-CM

## 2025-01-09 PROCEDURE — 75574 CT ANGIO HRT W/3D IMAGE: CPT

## 2025-01-09 PROCEDURE — 36415 COLL VENOUS BLD VENIPUNCTURE: CPT

## 2025-01-09 RX ORDER — NITROGLYCERIN 0.4 MG/1
0.8 TABLET SUBLINGUAL ONCE
Status: COMPLETED | OUTPATIENT
Start: 2025-01-09 | End: 2025-01-09

## 2025-01-09 RX ADMIN — NITROGLYCERIN 0.8 MG: 0.4 TABLET SUBLINGUAL at 08:44

## 2025-02-24 ENCOUNTER — OFFICE VISIT (OUTPATIENT)
Dept: FAMILY MEDICINE CLINIC | Facility: CLINIC | Age: 50
End: 2025-02-24
Payer: COMMERCIAL

## 2025-02-24 VITALS
SYSTOLIC BLOOD PRESSURE: 102 MMHG | OXYGEN SATURATION: 99 % | TEMPERATURE: 97.7 F | BODY MASS INDEX: 21.16 KG/M2 | HEIGHT: 65 IN | DIASTOLIC BLOOD PRESSURE: 64 MMHG | WEIGHT: 127 LBS | HEART RATE: 77 BPM | RESPIRATION RATE: 16 BRPM

## 2025-02-24 DIAGNOSIS — M77.11 BILATERAL TENNIS ELBOW: ICD-10-CM

## 2025-02-24 DIAGNOSIS — R59.0 HILAR ADENOPATHY: Primary | ICD-10-CM

## 2025-02-24 DIAGNOSIS — R20.0 LT FACIAL NUMBNESS: ICD-10-CM

## 2025-02-24 DIAGNOSIS — M77.12 BILATERAL TENNIS ELBOW: ICD-10-CM

## 2025-02-24 DIAGNOSIS — E55.9 VITAMIN D DEFICIENCY: ICD-10-CM

## 2025-02-24 DIAGNOSIS — G89.29 CHRONIC PAIN OF MULTIPLE JOINTS: ICD-10-CM

## 2025-02-24 DIAGNOSIS — M25.50 CHRONIC PAIN OF MULTIPLE JOINTS: ICD-10-CM

## 2025-02-24 DIAGNOSIS — Z00.00 LABORATORY EXAMINATION ORDERED AS PART OF A ROUTINE GENERAL MEDICAL EXAMINATION: ICD-10-CM

## 2025-02-24 PROBLEM — Q21.12 PFO (PATENT FORAMEN OVALE): Status: ACTIVE | Noted: 2025-02-24

## 2025-02-24 PROCEDURE — 99214 OFFICE O/P EST MOD 30 MIN: CPT | Performed by: PHYSICIAN ASSISTANT

## 2025-02-24 NOTE — PROGRESS NOTES
"Subjective   Farzana Sky is a 49 y.o. female.     History of Present Illness    Since the last visit, she has overall felt well.  She has Vitamin D deficiency and will update labs for continued management.  she has been compliant with current medications have reviewed them.  The patient denies medication side effects.  Will refill medications. /64   Pulse 77   Temp 97.7 °F (36.5 °C) (Temporal)   Resp 16   Ht 165.1 cm (65\")   Wt 57.6 kg (127 lb)   SpO2 99%   BMI 21.13 kg/m² .  BMI is within normal parameters. No other follow-up for BMI required.    Has area N/T just lateral to left nare  She sees allergist----pred does help if  takes it    Results for orders placed or performed in visit on 02/02/23   TSH    Collection Time: 02/02/23  4:44 PM    Specimen: Blood   Result Value Ref Range    TSH 2.800 0.270 - 4.200 uIU/mL   Hx murmur  Has PFO---need her to start ASA  Had last echo--Dr. Ramirez advise repeating at age 51  Echo was 12-  Calculated left ventricular EF = 61.1% Estimated left ventricular EF was in agreement with the calculated left ventricular EF. Left ventricular systolic function is normal.  Left ventricular diastolic function was normal.  There is mild mitral valve prolapse of the posterior mitral leaflet.  Mild mitral valve regurgitation is present  Mild tricuspid valve regurgitation is present.  Estimated right ventricular systolic pressure from tricuspid regurgitation is normal (<35 mmHg).  Interatrial septal aneurysm present.  Saline test results are positive but saline is seen very late. 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 Please see  separate cardiology report of the cardiac findings.     Right hilar adenopathy measures 1.1 cm in short axis dimension. In the  absence of known malignancy and additional findings, findings are  favored to be benign but can be followed up with CT chest in 3 months to  ensure stability if clinically indicated.     Trace pericardial effusion. Pulmonary is cannot be evaluated due to  contrast timing.        Radiation dose reduction techniques were utilized, including automated  exposure control and exposure modulation based on body size.       This report was finalized on 1/14/2025 10:49 PM by Dr. Anthony Thibodeaux M.D on Workstation: [id number]    Fam hx RA---cousin X 2 (m)      Also she has multiple joint pains chronic in her elbows, ankles, feet, right hip, ... AM stiffness for couple hours  Sore and stiff upon awakening  Has had tennis elbow right greater than left for a year in the strap brace is not helping and will refer to Ortho  The following portions of the patient's history were reviewed and updated as appropriate: allergies, current medications, past family history, past medical history, past social history, past surgical history, and problem list.    Review of Systems   Constitutional:  Positive for fatigue. Negative for chills, diaphoresis and fever.   HENT:  Negative for congestion, sore throat and swollen glands.    Respiratory:  Negative for cough.    Cardiovascular:  Negative for chest pain.   Gastrointestinal:  Negative for abdominal pain, nausea and vomiting.   Genitourinary:  Negative for dysuria.   Musculoskeletal:  Negative for myalgias and neck pain.   Skin:  Negative for rash.   Neurological:  Negative for weakness and  numbness.       Objective   Physical Exam  Vitals and nursing note reviewed.   Constitutional:       General: She is not in acute distress.     Appearance: She is well-developed. She is not ill-appearing or toxic-appearing.   HENT:      Head: Normocephalic.      Right Ear: External ear normal.      Left Ear: External ear normal.      Nose: Nose normal.      Mouth/Throat:      Pharynx: Oropharynx is clear.   Eyes:      General: No scleral icterus.     Conjunctiva/sclera: Conjunctivae normal.      Pupils: Pupils are equal, round, and reactive to light.   Neck:      Thyroid: No thyromegaly.   Cardiovascular:      Rate and Rhythm: Normal rate and regular rhythm.      Heart sounds: Normal heart sounds. No murmur heard.  Pulmonary:      Effort: Pulmonary effort is normal. No respiratory distress.      Breath sounds: Normal breath sounds.   Musculoskeletal:         General: No deformity. Normal range of motion.      Cervical back: Normal range of motion and neck supple.   Skin:     General: Skin is warm and dry.      Findings: No rash.   Neurological:      General: No focal deficit present.      Mental Status: She is alert and oriented to person, place, and time. Mental status is at baseline.      Cranial Nerves: No cranial nerve deficit.      Sensory: No sensory deficit.      Motor: No weakness.      Gait: Gait normal.      Comments: Can feel touch lateral of left nare---tingling   Psychiatric:         Mood and Affect: Mood normal.         Behavior: Behavior normal.         Thought Content: Thought content normal.         Judgment: Judgment normal.           Assessment & Plan   Diagnoses and all orders for this visit:    1. Hilar adenopathy (Primary)  Comments:  CTA coronary ---right hilar adenopathy  Orders:  -     CT Chest Without Contrast Diagnostic; Future    2. Lt facial numbness  Comments:  has N/T spot lateral to left nare  Orders:  -     Ambulatory Referral to Neurology    3. Vitamin D deficiency    4. Laboratory  examination ordered as part of a routine general medical examination      Next echo is due age 51 per Dr. 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

## 2025-03-04 ENCOUNTER — OFFICE VISIT (OUTPATIENT)
Dept: ORTHOPEDIC SURGERY | Facility: CLINIC | Age: 50
End: 2025-03-04
Payer: COMMERCIAL

## 2025-03-04 ENCOUNTER — LAB (OUTPATIENT)
Dept: LAB | Facility: HOSPITAL | Age: 50
End: 2025-03-04
Payer: COMMERCIAL

## 2025-03-04 VITALS — TEMPERATURE: 98 F | WEIGHT: 126 LBS | HEIGHT: 65 IN | BODY MASS INDEX: 20.99 KG/M2

## 2025-03-04 DIAGNOSIS — M25.529 CHRONIC ELBOW PAIN, UNSPECIFIED LATERALITY: Primary | ICD-10-CM

## 2025-03-04 DIAGNOSIS — M77.12 LATERAL EPICONDYLITIS OF BOTH ELBOWS: ICD-10-CM

## 2025-03-04 DIAGNOSIS — G89.29 CHRONIC ELBOW PAIN, UNSPECIFIED LATERALITY: Primary | ICD-10-CM

## 2025-03-04 DIAGNOSIS — M77.11 LATERAL EPICONDYLITIS OF BOTH ELBOWS: ICD-10-CM

## 2025-03-04 LAB
25(OH)D3 SERPL-MCNC: 61.8 NG/ML (ref 30–100)
ALBUMIN SERPL-MCNC: 3.8 G/DL (ref 3.5–5.2)
ALBUMIN/GLOB SERPL: 1.3 G/DL
ALP SERPL-CCNC: 54 U/L (ref 39–117)
ALT SERPL W P-5'-P-CCNC: 10 U/L (ref 1–33)
ANION GAP SERPL CALCULATED.3IONS-SCNC: 10.4 MMOL/L (ref 5–15)
AST SERPL-CCNC: 20 U/L (ref 1–32)
BACTERIA UR QL AUTO: ABNORMAL /HPF
BASOPHILS # BLD AUTO: 0.03 10*3/MM3 (ref 0–0.2)
BASOPHILS NFR BLD AUTO: 0.6 % (ref 0–1.5)
BILIRUB SERPL-MCNC: 0.3 MG/DL (ref 0–1.2)
BILIRUB UR QL STRIP: NEGATIVE
BUN SERPL-MCNC: 12 MG/DL (ref 6–20)
BUN/CREAT SERPL: 14.8 (ref 7–25)
CALCIUM SPEC-SCNC: 8.5 MG/DL (ref 8.6–10.5)
CHLORIDE SERPL-SCNC: 104 MMOL/L (ref 98–107)
CHOLEST SERPL-MCNC: 170 MG/DL (ref 0–200)
CHROMATIN AB SERPL-ACNC: <10 IU/ML (ref 0–14)
CLARITY UR: CLEAR
CO2 SERPL-SCNC: 23.6 MMOL/L (ref 22–29)
COLOR UR: YELLOW
CREAT SERPL-MCNC: 0.81 MG/DL (ref 0.57–1)
CRP SERPL-MCNC: <0.3 MG/DL (ref 0–0.5)
DEPRECATED RDW RBC AUTO: 43.1 FL (ref 37–54)
EGFRCR SERPLBLD CKD-EPI 2021: 89.7 ML/MIN/1.73
EOSINOPHIL # BLD AUTO: 0.12 10*3/MM3 (ref 0–0.4)
EOSINOPHIL NFR BLD AUTO: 2.3 % (ref 0.3–6.2)
ERYTHROCYTE [DISTWIDTH] IN BLOOD BY AUTOMATED COUNT: 12 % (ref 12.3–15.4)
ERYTHROCYTE [SEDIMENTATION RATE] IN BLOOD: 3 MM/HR (ref 0–20)
FOLATE SERPL-MCNC: >20 NG/ML (ref 4.78–24.2)
GLOBULIN UR ELPH-MCNC: 3 GM/DL
GLUCOSE SERPL-MCNC: 90 MG/DL (ref 65–99)
GLUCOSE UR STRIP-MCNC: NEGATIVE MG/DL
HCT VFR BLD AUTO: 39 % (ref 34–46.6)
HDLC SERPL-MCNC: 61 MG/DL (ref 40–60)
HGB BLD-MCNC: 13 G/DL (ref 12–15.9)
HGB UR QL STRIP.AUTO: NEGATIVE
HYALINE CASTS UR QL AUTO: ABNORMAL /LPF
IMM GRANULOCYTES # BLD AUTO: 0.02 10*3/MM3 (ref 0–0.05)
IMM GRANULOCYTES NFR BLD AUTO: 0.4 % (ref 0–0.5)
KETONES UR QL STRIP: NEGATIVE
LDLC SERPL CALC-MCNC: 99 MG/DL (ref 0–100)
LDLC/HDLC SERPL: 1.62 {RATIO}
LEUKOCYTE ESTERASE UR QL STRIP.AUTO: ABNORMAL
LYMPHOCYTES # BLD AUTO: 1.27 10*3/MM3 (ref 0.7–3.1)
LYMPHOCYTES NFR BLD AUTO: 24.1 % (ref 19.6–45.3)
MAGNESIUM SERPL-MCNC: 2.1 MG/DL (ref 1.6–2.6)
MCH RBC QN AUTO: 32.4 PG (ref 26.6–33)
MCHC RBC AUTO-ENTMCNC: 33.3 G/DL (ref 31.5–35.7)
MCV RBC AUTO: 97.3 FL (ref 79–97)
MONOCYTES # BLD AUTO: 0.42 10*3/MM3 (ref 0.1–0.9)
MONOCYTES NFR BLD AUTO: 8 % (ref 5–12)
NEUTROPHILS NFR BLD AUTO: 3.4 10*3/MM3 (ref 1.7–7)
NEUTROPHILS NFR BLD AUTO: 64.6 % (ref 42.7–76)
NITRITE UR QL STRIP: NEGATIVE
NRBC BLD AUTO-RTO: 0 /100 WBC (ref 0–0.2)
PH UR STRIP.AUTO: 6.5 [PH] (ref 5–8)
PLATELET # BLD AUTO: 210 10*3/MM3 (ref 140–450)
PMV BLD AUTO: 10 FL (ref 6–12)
POTASSIUM SERPL-SCNC: 4 MMOL/L (ref 3.5–5.2)
PROT SERPL-MCNC: 6.8 G/DL (ref 6–8.5)
PROT UR QL STRIP: NEGATIVE
RBC # BLD AUTO: 4.01 10*6/MM3 (ref 3.77–5.28)
RBC # UR STRIP: ABNORMAL /HPF
REF LAB TEST METHOD: ABNORMAL
SODIUM SERPL-SCNC: 138 MMOL/L (ref 136–145)
SP GR UR STRIP: 1.01 (ref 1–1.03)
SQUAMOUS #/AREA URNS HPF: ABNORMAL /HPF
T4 FREE SERPL-MCNC: 1.08 NG/DL (ref 0.92–1.68)
TRIGL SERPL-MCNC: 50 MG/DL (ref 0–150)
TSH SERPL DL<=0.05 MIU/L-ACNC: 3.41 UIU/ML (ref 0.27–4.2)
UROBILINOGEN UR QL STRIP: ABNORMAL
VIT B12 BLD-MCNC: >2000 PG/ML (ref 211–946)
VLDLC SERPL-MCNC: 10 MG/DL (ref 5–40)
WBC # UR STRIP: ABNORMAL /HPF
WBC NRBC COR # BLD AUTO: 5.26 10*3/MM3 (ref 3.4–10.8)

## 2025-03-04 PROCEDURE — 82746 ASSAY OF FOLIC ACID SERUM: CPT | Performed by: PHYSICIAN ASSISTANT

## 2025-03-04 PROCEDURE — 83735 ASSAY OF MAGNESIUM: CPT | Performed by: PHYSICIAN ASSISTANT

## 2025-03-04 PROCEDURE — 86140 C-REACTIVE PROTEIN: CPT | Performed by: PHYSICIAN ASSISTANT

## 2025-03-04 PROCEDURE — 86038 ANTINUCLEAR ANTIBODIES: CPT | Performed by: PHYSICIAN ASSISTANT

## 2025-03-04 PROCEDURE — 82306 VITAMIN D 25 HYDROXY: CPT | Performed by: PHYSICIAN ASSISTANT

## 2025-03-04 PROCEDURE — 82607 VITAMIN B-12: CPT | Performed by: PHYSICIAN ASSISTANT

## 2025-03-04 PROCEDURE — 84439 ASSAY OF FREE THYROXINE: CPT | Performed by: PHYSICIAN ASSISTANT

## 2025-03-04 PROCEDURE — 85652 RBC SED RATE AUTOMATED: CPT | Performed by: PHYSICIAN ASSISTANT

## 2025-03-04 PROCEDURE — 80061 LIPID PANEL: CPT | Performed by: PHYSICIAN ASSISTANT

## 2025-03-04 PROCEDURE — 86431 RHEUMATOID FACTOR QUANT: CPT | Performed by: PHYSICIAN ASSISTANT

## 2025-03-04 NOTE — PROGRESS NOTES
[facility]              New Problem      Patient Name: Farzana Sky  : 1975  Primary Care Physician: Ciera Wise PA-C        Chief Complaint:  Elbow pain    HPI:   Farzana Sky is a 49 y.o. year old who presents today for evaluation.  History of Present Illness  The patient presents for evaluation of bilateral elbow pain.    She reports experiencing pain in her right elbow, which she attributes to overuse during yard work, specifically when using a weed eater. She describes a sensation of her arm locking up, rendering it immobile. This issue has been persistent since 2024, despite refraining from using the weed eater. She also mentions a decrease in her overall strength. The pain is particularly noticeable at night, often disrupting her sleep. She has attempted to alleviate the discomfort by sleeping with her arm straight, but this has proven ineffective. She has not experienced any numbness or tingling in her hand but reports a lack of sensation due to a pre-existing neck condition. She has been using a strap for about a month but is unsure of its effectiveness. She has considered trying Voltaren and has never received a cortisone injection. She has previously taken ibuprofen and steroids for unrelated issues, which provided temporary relief.    FAMILY HISTORY  Her mother has bunions.    Past Medical/Surgical, Social and Family History:  I have reviewed and/or updated pertinent history as noted in the medical record including:  Past Medical History:   Diagnosis Date    Abnormal MRI, cervical spine     DDD changes C4-5, 5-6 with mod R lateral disc protrusion of broad based herniation abuts ventral surface of cord anterlolaterally to R with mild to mod flattening of cord at c5-6, mild cerebellar tonsillar ectopia    Allergic     Take allergy shots    Allergic dermatitis of eyelid 2011    Arthritis of neck 2006    Cervical disc disorder 2006    Cervical radiculopathy 10/19/2015     Clavicle fracture     age 18, right, closed    Endometrial polyp 03/2011    Family history of first-degree relative with cardiomyopathy     Fracture of wrist 1990    H/O colonoscopy 07/01/2011    [facility]: Dr. Gonzalez: internal hemorrhiods and non-thrombosed external hemorrhoids    Headache     Heart murmur     History of esophagogastroduodenoscopy (EGD) 07/01/2011    [facility]: Dr. Gonzalez: LA grade B esophagitis, erythema of gastric mucosa     IBS (irritable bowel syndrome) 05/25/2011    Low back strain 2018    Neck strain 1999    Sialoadenitis 06/10/2009    Tennis elbow 10/25     Past Surgical History:   Procedure Laterality Date    CHOLECYSTECTOMY       Social History     Occupational History    Not on file   Tobacco Use    Smoking status: Never    Smokeless tobacco: Never   Vaping Use    Vaping status: Never Used   Substance and Sexual Activity    Alcohol use: Yes     Comment: Drink socially about once every 2-3 months    Drug use: No    Sexual activity: Yes     Partners: Male     Birth control/protection: Vasectomy          Allergies: No Known Allergies    Medications:   Home Medications:  Current Outpatient Medications on File Prior to Visit   Medication Sig    hydrocortisone 2.5 % ointment Apply 1 Application topically to the appropriate area as directed 1 (One) Time Per Week.    LO LOESTRIN FE 1 MG-10 MCG / 10 MCG tablet TK 1 T PO D    Multiple Vitamin (MULTI VITAMIN DAILY PO) Take  by mouth.    Triamcinolone Acetonide (NASACORT) 55 MCG/ACT nasal inhaler Administer 2 sprays into the nostril(s) as directed by provider Daily.    valACYclovir (VALTREX) 1000 MG tablet TK 1 T PO D     No current facility-administered medications on file prior to visit.         ROS:  ROS negative except as listed in the HPI.    Physical Exam:   49 y.o. female  Body mass index is 20.97 kg/m²., 57.2 kg (126 lb)  Vitals:    03/04/25 1519   Temp: 98 °F (36.7 °C)     General: Alert, cooperative, appears well and in no observable distress. Appears  stated age and BMI as listed above.  HEENT: Normocephalic, atraumatic on external visual inspection.  CV: No significant peripheral edema.  Respiratory: Normal respiratory effort.  Skin: Warm & well perfused; appropriate skin turgor.  Psych: Appropriate mood & affect.  Neuro: Gross sensation and motor intact in affected extremity/extremities.  Vascular: Peripheral pulses palpable in affected extremity/extremities.   Physical Exam  Elbow was examined.    MSK Exam:  Elbow Musculoskeletal Exam    Inspection    Right      Right elbow inspection is normal.    Left      Left elbow inspection is normal.    Palpation    Right      Crepitus (radiocapitellar): none        Crepitus (ulnohumeral): none      Crepitus (forearm rotation): none      Tenderness: present        Lateral/extensor origin: moderate    Left      Crepitus (radiocapitellar): none      Crepitus (ulnohumeral): none        Crepitus (forearm rotation): none      Tenderness: present        Lateral/extensor origin: mild    Range of Motion    Right      Active Extension: 0      Passive Extension: 0      Active Flexion: 130      Passive Flexion: 130      Active Pronation: 90      Passive Pronation: 90      Active Supination: 90      Passive Supination: 90    Left      Active Extension: 0      Passive Extension: 0      Active Flexion: 130      Passive Flexion: 130      Active Pronation: 90      Passive Pronation: 90      Active Supination: 90      Passive Supination: 90    Strength    Right       Extension: 5/5.       Flexion: 5/5.       Pronation: 5/5.       Supination: 5/5.       Finger abduction: 5/5.        strength: 5/5.  strength is affected by pain.     Left      Extension: 5/5.       Flexion: 5/5.       Pronation: 5/5.       Supination: 5/5.       Finger abduction: 5/5.        strength: 5/5.  strength is not affected by pain.     Neurovascular    Right      Right elbow nerve sensation is normal.    Special Tests    Right    Neurological  Signs      Tinel's - cubital tunnel: negative              Epicondylitis Signs         Pain with resisted wrist extension: positive    Left    Neurological Signs      Tinel's - cubital tunnel: negative              Epicondylitis Signs         Pain with resisted wrist extension: negative         Radiology:    Results  Imaging  X-ray of the elbow shows no bony abnormalities, suggesting the issue is likely soft tissue related.    The following X-rays were ordered/reviewed today to evaluate the patient's symptoms: Elbow: 2 views of the bilateral elbow show no obvious acute or chronic bony pathology. The joints are congruent and well aligned.    Procedure:   N/A      Misc. Data/Labs: N/A    Assessment & Plan:      ICD-10-CM ICD-9-CM   1. Chronic elbow pain, unspecified laterality  M25.529 719.42    G89.29 338.29   2. Lateral epicondylitis of both elbows  M77.11 726.32    M77.12      No orders of the defined types were placed in this encounter.    Orders Placed This Encounter   Procedures    XR Elbow 2 View Right    XR Elbow 2 View Left       Assessment & Plan  1. Bilateral elbow pain.  The patient's symptoms suggest a diagnosis of tennis elbow, although the physical examination does not strongly support this- I think her symptoms are just currently mild. The x-ray results do not indicate any bony abnormalities, suggesting that the issue is likely soft tissue related. She was advised to use a tennis elbow strap, positioning it slightly below the most tender area. A wrist brace was also recommended for use during the day and at night if she tends to curl up her wrist during sleep. Voltaren gel was suggested for topical application, and she was instructed to apply ice to the affected area for 15 to 20 minutes post-activity. For severe symptoms, oral medication such as ibuprofen can be taken in conjunction with the Voltaren gel. Physical therapy, including dry needling, was also recommended as a potential treatment option.  If these measures prove ineffective, an MRI will be considered to check for any tears. Platelet injections, although not covered by insurance, were discussed as a potential treatment option. Surgery was mentioned as a last resort, but it is rarely necessary.      Continue with activity modifications as needed/discussed.  Continue ICE and/or HEAT PRN.  Recommend to continue activity as tolerated, focus on stretching and strengthening of the joint.    Focus on posture and body mechanics to improve/prevent symptoms.   Patient encouraged to call with questions or concerns prior to follow up.  Will discuss with attending as needed.  Consider additional referrals, work up and/or advanced imaging as indicated or if patient fails to respond to conservative care.    Return if symptoms worsen or fail to improve.    Patient or patient representative verbalized consent for the use of Ambient Listening during the visit with  HODA Martinez for chart documentation. 3/4/2025  16:32 EST     HODA Moscoso    Dictation software was used to complete a portion or all of this note.

## 2025-03-05 LAB — ANA SER QL: NEGATIVE

## 2025-03-13 ENCOUNTER — LAB (OUTPATIENT)
Dept: LAB | Facility: HOSPITAL | Age: 50
End: 2025-03-13
Payer: COMMERCIAL

## 2025-03-13 DIAGNOSIS — E83.51 HYPOCALCEMIA: ICD-10-CM

## 2025-03-13 LAB
CA-I SERPL ISE-MCNC: 1.2 MMOL/L (ref 1.15–1.35)
PHOSPHATE SERPL-MCNC: 2.8 MG/DL (ref 2.5–4.5)
PTH-INTACT SERPL-MCNC: 38.6 PG/ML (ref 15–65)

## 2025-03-13 PROCEDURE — 36415 COLL VENOUS BLD VENIPUNCTURE: CPT

## 2025-03-13 PROCEDURE — 84100 ASSAY OF PHOSPHORUS: CPT | Performed by: PHYSICIAN ASSISTANT

## 2025-03-13 PROCEDURE — 82330 ASSAY OF CALCIUM: CPT | Performed by: PHYSICIAN ASSISTANT

## 2025-03-13 PROCEDURE — 83970 ASSAY OF PARATHORMONE: CPT | Performed by: PHYSICIAN ASSISTANT

## 2025-04-11 ENCOUNTER — HOSPITAL ENCOUNTER (OUTPATIENT)
Dept: CT IMAGING | Facility: HOSPITAL | Age: 50
Discharge: HOME | End: 2025-04-11
Admitting: PHYSICIAN ASSISTANT
Payer: COMMERCIAL

## 2025-04-11 DIAGNOSIS — R59.0 HILAR ADENOPATHY: ICD-10-CM

## 2025-04-11 PROCEDURE — 71250 CT THORAX DX C-: CPT
